# Patient Record
Sex: FEMALE | Race: BLACK OR AFRICAN AMERICAN | URBAN - METROPOLITAN AREA
[De-identification: names, ages, dates, MRNs, and addresses within clinical notes are randomized per-mention and may not be internally consistent; named-entity substitution may affect disease eponyms.]

---

## 2023-06-30 ENCOUNTER — OFFICE VISIT (OUTPATIENT)
Dept: INTERNAL MEDICINE CLINIC | Facility: CLINIC | Age: 73
End: 2023-06-30

## 2023-06-30 VITALS
WEIGHT: 136.6 LBS | HEIGHT: 64 IN | HEART RATE: 62 BPM | DIASTOLIC BLOOD PRESSURE: 83 MMHG | TEMPERATURE: 98 F | OXYGEN SATURATION: 100 % | SYSTOLIC BLOOD PRESSURE: 143 MMHG | BODY MASS INDEX: 23.32 KG/M2

## 2023-06-30 DIAGNOSIS — Z59.9 FINANCIAL DIFFICULTIES: ICD-10-CM

## 2023-06-30 DIAGNOSIS — Z13.1 SCREENING FOR DIABETES MELLITUS: ICD-10-CM

## 2023-06-30 DIAGNOSIS — G47.09 OTHER INSOMNIA: ICD-10-CM

## 2023-06-30 DIAGNOSIS — Z11.59 ENCOUNTER FOR HEPATITIS C SCREENING TEST FOR LOW RISK PATIENT: ICD-10-CM

## 2023-06-30 DIAGNOSIS — Z23 ENCOUNTER FOR IMMUNIZATION: ICD-10-CM

## 2023-06-30 DIAGNOSIS — Z13.220 ENCOUNTER FOR SCREENING FOR LIPID DISORDER: ICD-10-CM

## 2023-06-30 DIAGNOSIS — I10 HYPERTENSION, ESSENTIAL: Primary | ICD-10-CM

## 2023-06-30 DIAGNOSIS — G89.29 CHRONIC BILATERAL LOW BACK PAIN WITHOUT SCIATICA: ICD-10-CM

## 2023-06-30 DIAGNOSIS — M54.50 CHRONIC BILATERAL LOW BACK PAIN WITHOUT SCIATICA: ICD-10-CM

## 2023-06-30 DIAGNOSIS — E78.2 MIXED HYPERLIPIDEMIA: ICD-10-CM

## 2023-06-30 RX ORDER — AMLODIPINE BESYLATE 5 MG/1
5 TABLET ORAL DAILY
COMMUNITY
End: 2023-06-30 | Stop reason: SDUPTHER

## 2023-06-30 RX ORDER — ROSUVASTATIN CALCIUM 5 MG/1
5 TABLET, COATED ORAL DAILY
Qty: 30 TABLET | Refills: 2 | Status: SHIPPED | OUTPATIENT
Start: 2023-06-30 | End: 2024-06-29

## 2023-06-30 RX ORDER — LORAZEPAM 0.5 MG/1
0.5 TABLET ORAL
Qty: 30 TABLET | Refills: 0 | Status: SHIPPED | OUTPATIENT
Start: 2023-06-30

## 2023-06-30 RX ORDER — TIMOLOL MALEATE 5 MG/ML
1 SOLUTION/ DROPS OPHTHALMIC 2 TIMES DAILY
COMMUNITY
Start: 2023-01-26 | End: 2024-01-26

## 2023-06-30 RX ORDER — AMLODIPINE BESYLATE 10 MG/1
1 TABLET ORAL DAILY
COMMUNITY
Start: 2023-04-17 | End: 2023-06-30 | Stop reason: ALTCHOICE

## 2023-06-30 RX ORDER — AMLODIPINE BESYLATE 5 MG/1
5 TABLET ORAL DAILY
Qty: 30 TABLET | Refills: 2 | Status: SHIPPED | OUTPATIENT
Start: 2023-06-30

## 2023-06-30 RX ORDER — LATANOPROST 50 UG/ML
SOLUTION/ DROPS OPHTHALMIC
COMMUNITY
Start: 2023-06-10

## 2023-06-30 RX ORDER — LATANOPROST 50 UG/ML
SOLUTION/ DROPS OPHTHALMIC
COMMUNITY
Start: 2023-03-17 | End: 2023-06-30 | Stop reason: SDUPTHER

## 2023-06-30 RX ORDER — ACETAMINOPHEN 500 MG
500 TABLET ORAL 2 TIMES DAILY
COMMUNITY

## 2023-06-30 RX ORDER — DOXEPIN HYDROCHLORIDE 10 MG/1
10 CAPSULE ORAL
Qty: 30 CAPSULE | Refills: 2 | Status: SHIPPED | OUTPATIENT
Start: 2023-06-30

## 2023-06-30 RX ORDER — HYDROCHLOROTHIAZIDE 25 MG/1
25 TABLET ORAL DAILY
Qty: 30 TABLET | Refills: 2 | Status: SHIPPED | OUTPATIENT
Start: 2023-06-30

## 2023-06-30 RX ORDER — HYDROCHLOROTHIAZIDE 25 MG/1
25 TABLET ORAL DAILY
COMMUNITY
Start: 2023-06-10 | End: 2023-06-30 | Stop reason: SDUPTHER

## 2023-06-30 RX ORDER — DICLOFENAC POTASSIUM 50 MG/1
TABLET, FILM COATED ORAL
COMMUNITY
Start: 2023-06-10

## 2023-06-30 RX ORDER — ROSUVASTATIN CALCIUM 5 MG/1
5 TABLET, COATED ORAL DAILY
COMMUNITY
Start: 2023-01-24 | End: 2023-06-30 | Stop reason: SDUPTHER

## 2023-06-30 RX ORDER — LORAZEPAM 1 MG/1
1 TABLET ORAL EVERY 8 HOURS PRN
COMMUNITY
End: 2023-06-30

## 2023-06-30 RX ORDER — LORAZEPAM 1 MG/1
1 TABLET ORAL
COMMUNITY
Start: 2023-01-24 | End: 2023-06-30

## 2023-06-30 SDOH — ECONOMIC STABILITY - INCOME SECURITY: PROBLEM RELATED TO HOUSING AND ECONOMIC CIRCUMSTANCES, UNSPECIFIED: Z59.9

## 2023-07-01 PROBLEM — E78.2 MIXED HYPERLIPIDEMIA: Status: ACTIVE | Noted: 2023-07-01

## 2023-07-01 PROBLEM — G47.09 OTHER INSOMNIA: Status: ACTIVE | Noted: 2023-07-01

## 2023-07-01 PROBLEM — M54.50 CHRONIC BILATERAL LOW BACK PAIN WITHOUT SCIATICA: Status: ACTIVE | Noted: 2023-07-01

## 2023-07-01 PROBLEM — G89.29 CHRONIC BILATERAL LOW BACK PAIN WITHOUT SCIATICA: Status: ACTIVE | Noted: 2023-07-01

## 2023-07-01 PROBLEM — Z23 ENCOUNTER FOR IMMUNIZATION: Status: ACTIVE | Noted: 2023-07-01

## 2023-07-01 NOTE — ASSESSMENT & PLAN NOTE
BP Readings from Last 3 Encounters:   06/30/23 143/83     She did not take her medications yet this morning  Continue amlodipine 5 mg daily and HCTZ 25 mg daily  Limit sodium and salt intake  Avoid alcohol and caffeine  Recheck in one month, sooner if needed

## 2023-07-01 NOTE — ASSESSMENT & PLAN NOTE
Lumbar spine XR from 1/23/23: 5 projections of the lumbar spine were obtained  There is minimal scoliotic curvature with apex to the right centered at L3  There is minimal interspace narrowing at the right side of the L3/4 interspace and left side of the L2/3 interspace  There is generalized narrowing of the L4/L5 interspace  There is   moderate to severe narrowing at L5/S1  There is degenerative change of the apophyseal joints of L2/L3 and L3/L4 left greater than right  Due to lack of insurance, declines PT at this time  We did discuss pain management as she has had no relief with various medications  She was agreeable  I gave the patient and her daughter the contact information for [facility] financial counselors  The daughter will help her to take care of the above

## 2023-07-01 NOTE — PROGRESS NOTES
Name: Sarah Owens      : 1950      MRN: [id number]  Encounter Provider: Sari Nielson PA-C  Encounter Date: 2023   Encounter department: [facility]     Assessment & Plan     1  Hypertension, essential  Assessment & Plan:  BP Readings from Last 3 Encounters:   23 143/83     She did not take her medications yet this morning  Continue amlodipine 5 mg daily and HCTZ 25 mg daily  Limit sodium and salt intake  Avoid alcohol and caffeine  Recheck in one month, sooner if needed  Orders:  -     hydrochlorothiazide (HYDRODIURIL) 25 mg tablet; Take 1 tablet (25 mg total) by mouth daily  -     amLODIPine (NORVASC) 5 mg tablet; Take 1 tablet (5 mg total) by mouth daily  -     CBC and differential; Future  -     Comprehensive metabolic panel; Future  -     TSH, 3rd generation with Free T4 reflex; Future    2  Mixed hyperlipidemia  Assessment & Plan: Will recheck lipid panel  Continue Crestor 5 mg daily  Low fat diet  Orders:  -     rosuvastatin (CRESTOR) 5 mg tablet; Take 1 tablet (5 mg total) by mouth daily    3  Other insomnia  Assessment & Plan:  She used to be on Ativan 2 mg every night for over 10 years  Last year she did see a PCP in the [facility] who weaned her to 1 mg every night  Her daughter is agreeable to continue to wean her off of this medication  The daughter believes some of her insomnia stems from underlying anxiety and she appears anxious as times  The patient does not have any input on this  We will wean her to 0 5 mg every night for 30 days  Discussed other treatment options  Patient and daughter agreeable to try doxepin  Discussed this may help with anxiety as well  Discussed possible side effects  Will recheck in 1 month  Gave precaution on Ativan withdrawal symptoms  Orders:  -     LORazepam (Ativan) 0 5 mg tablet; Take 1 tablet (0 5 mg total) by mouth daily at bedtime  -     doxepin (SINEquan) 10 mg capsule;  Take 1 capsule (10 mg total) by mouth daily at bedtime    4  Chronic bilateral low back pain without sciatica  Assessment & Plan:  Lumbar spine XR from 1/23/23: 5 projections of the lumbar spine were obtained  There is minimal scoliotic curvature with apex to the right centered at L3  There is minimal interspace narrowing at the right side of the L3/4 interspace and left side of the L2/3 interspace  There is generalized narrowing of the L4/L5 interspace  There is   moderate to severe narrowing at L5/S1  There is degenerative change of the apophyseal joints of L2/L3 and L3/L4 left greater than right  Due to lack of insurance, declines PT at this time  We did discuss pain management as she has had no relief with various medications  She was agreeable  I gave the patient and her daughter the [facility] information for   [facility]  The daughter will help her to take care of the above  Orders:  -     Ambulatory Referral to Pain Management; Future    5  Encounter for immunization  Assessment & Plan:  Received Tdap and Prevnar immunizations  Patient tolerated well  Orders:  -     Pneumococcal Conjugate Vaccine 20-valent (Pcv20)  -     TDAP VACCINE GREATER THAN OR EQUAL TO 6YO IM    6  Encounter for hepatitis C screening test for low risk patient  -     Hepatitis C antibody; Future    7  Screening for diabetes mellitus  -     HEMOGLOBIN A1C W/ EAG ESTIMATION; Future    8  Encounter for screening for lipid disorder  -     Lipid panel; Future    9  Financial difficulties  -     Ambulatory Referral to Social Work Care Management Program; Future         Subjective      Patient is a 67year old female with a PMH of HTN, HLD, and insomnia presenting to [facility] care  She is here with her  and daughter  Her  and her moved here from [facility] last year  They are currently staying with their daughter  She states overall she is feeling well  Denies confusion, dizziness, headaches, and changes in vision   Denies chest pain, palpitations, and SOB  Denies LE edema  She is complaining of chronic back pain  States it has been present for a long time  She has been seen for this before  She was told she has scoliosis  They tried diclofenac and naproxen without relief  She states it is primarily her mid and low back  Denies any injuries or trauma  Denies redness, swelling, or warmth  Denies numbness, tingling, or weakness  The pain does not radiate  She can not describe the pain  Currently 7/10  Patient's daughter interpreted during visit  Declined formal  services  Review of Systems   Constitutional: Negative for appetite change, chills, diaphoresis, fatigue, fever and unexpected weight change  HENT: Negative for congestion, dental problem, hearing loss, sore throat, tinnitus and trouble swallowing  Eyes: Negative for visual disturbance  Respiratory: Negative for cough, chest tightness, shortness of breath and wheezing  Cardiovascular: Negative for chest pain, palpitations and leg swelling  Gastrointestinal: Negative for abdominal pain, blood in stool, constipation, diarrhea, nausea and vomiting  Endocrine: Negative for cold intolerance, heat intolerance, polydipsia, polyphagia and polyuria  Musculoskeletal: Positive for back pain and gait problem (due to pain)  Skin: Negative for rash  Neurological: Negative for dizziness, tremors, weakness, light-headedness, numbness and headaches  Hematological: Negative for adenopathy  Psychiatric/Behavioral: Positive for sleep disturbance  Negative for dysphoric mood, self-injury and suicidal ideas  The patient is nervous/anxious          Current Outpatient Medications on File Prior to Visit   Medication Sig   • acetaminophen (TYLENOL) 500 mg tablet Take 500 mg by mouth 2 (two) times a day   • diclofenac potassium (CATAFLAM) 50 mg tablet take 1 tablet by mouth three times a day if needed for back pain   • latanoprost (XALATAN) 0 005 % ophthalmic solution place 1 "drop into both eyes at bedtime   • timolol (TIMOPTIC) 0 5 % ophthalmic solution Apply 1 drop to eye 2 (two) times a day       Objective     /83 (BP Location: Right arm, Patient Position: Sitting, Cuff Size: Standard)   Pulse 62   Temp 98 °F (36 7 °C) (Temporal)   Ht 5' 4\" (1 626 m)   Wt 62 kg (136 lb 9 6 oz)   SpO2 100%   BMI 23 45 kg/m²     Physical Exam  Vitals and nursing note reviewed  Constitutional:       General: She is awake  She is not in acute distress  Appearance: Normal appearance  She is well-developed, well-groomed and normal weight  She is not ill-appearing  HENT:      Head: Normocephalic and atraumatic  Eyes:      General: No scleral icterus  Conjunctiva/sclera: Conjunctivae normal    Cardiovascular:      Rate and Rhythm: Normal rate and regular rhythm  Pulses: Normal pulses  Radial pulses are 2+ on the right side and 2+ on the left side  Heart sounds: Normal heart sounds  No murmur heard  Pulmonary:      Effort: Pulmonary effort is normal  No respiratory distress  Breath sounds: Normal breath sounds and air entry  No decreased air movement  No decreased breath sounds, wheezing, rhonchi or rales  Abdominal:      General: Abdomen is flat  Bowel sounds are normal  There is no distension  Palpations: Abdomen is soft  There is no mass  Tenderness: There is no abdominal tenderness  There is no right CVA tenderness, left CVA tenderness, guarding or rebound  Hernia: No hernia is present  Musculoskeletal:         General: Normal range of motion  Cervical back: Normal and neck supple  Thoracic back: Deformity present  No swelling, edema, signs of trauma, lacerations, spasms, tenderness or bony tenderness  Normal range of motion  Scoliosis present  Lumbar back: Deformity present  No swelling, edema, signs of trauma, lacerations, spasms, tenderness or bony tenderness  Normal range of motion   Negative right straight leg raise " test and negative left straight leg raise test  Scoliosis present  Right lower leg: No edema  Left lower leg: No edema  Lymphadenopathy:      Cervical: No cervical adenopathy  Skin:     General: Skin is warm  Coloration: Skin is not jaundiced  Findings: No rash  Neurological:      General: No focal deficit present  Mental Status: She is alert and oriented to person, place, and time  Mental status is at baseline  Gait: Gait abnormal (using cane)  Psychiatric:         Attention and Perception: Attention normal          Mood and Affect: Mood and affect normal          Speech: Speech normal          Behavior: Behavior normal  Behavior is cooperative           Cognition and Memory: Cognition normal        Joya Bowman PA-C

## 2023-07-01 NOTE — ASSESSMENT & PLAN NOTE
She used to be on Ativan 2 mg every night for over 10 years  [place] 1 mg every night  Her daughter is agreeable to continue to wean her off of this medication  The daughter believes some of her insomnia stems from underlying anxiety and she appears anxious as times  The patient does not have any input on this  We will wean her to 0 5 mg every night for 30 days  Discussed other treatment options  Patient and daughter agreeable to try doxepin  Discussed this may help with anxiety as well  Discussed possible side effects  Will recheck in 1 month  Gave precaution on Ativan withdrawal symptoms

## 2023-07-21 ENCOUNTER — APPOINTMENT (OUTPATIENT)
Dept: LAB | Facility: CLINIC | Age: 73
End: 2023-07-21

## 2023-07-21 DIAGNOSIS — Z13.220 ENCOUNTER FOR SCREENING FOR LIPID DISORDER: ICD-10-CM

## 2023-07-21 DIAGNOSIS — Z11.59 ENCOUNTER FOR HEPATITIS C SCREENING TEST FOR LOW RISK PATIENT: ICD-10-CM

## 2023-07-21 DIAGNOSIS — Z13.1 SCREENING FOR DIABETES MELLITUS: ICD-10-CM

## 2023-07-21 DIAGNOSIS — I10 HYPERTENSION, ESSENTIAL: ICD-10-CM

## 2023-07-21 LAB
ALBUMIN SERPL BCP-MCNC: 3.9 G/DL (ref 3.5–5)
ALP SERPL-CCNC: 80 U/L (ref 46–116)
ALT SERPL W P-5'-P-CCNC: 51 U/L (ref 12–78)
ANION GAP SERPL CALCULATED.3IONS-SCNC: 2 MMOL/L
AST SERPL W P-5'-P-CCNC: 34 U/L (ref 5–45)
BASOPHILS # BLD AUTO: 0.05 THOUSANDS/ÂΜL (ref 0–0.1)
BASOPHILS NFR BLD AUTO: 1 % (ref 0–1)
BILIRUB SERPL-MCNC: 0.48 MG/DL (ref 0.2–1)
BUN SERPL-MCNC: 18 MG/DL (ref 5–25)
CALCIUM SERPL-MCNC: 10 MG/DL (ref 8.3–10.1)
CHLORIDE SERPL-SCNC: 105 MMOL/L (ref 96–108)
CHOLEST SERPL-MCNC: 175 MG/DL
CO2 SERPL-SCNC: 31 MMOL/L (ref 21–32)
CREAT SERPL-MCNC: 1.01 MG/DL (ref 0.6–1.3)
EOSINOPHIL # BLD AUTO: 0.14 THOUSAND/ÂΜL (ref 0–0.61)
EOSINOPHIL NFR BLD AUTO: 2 % (ref 0–6)
ERYTHROCYTE [DISTWIDTH] IN BLOOD BY AUTOMATED COUNT: 12.1 % (ref 11.6–15.1)
GFR SERPL CREATININE-BSD FRML MDRD: 55 ML/MIN/1.73SQ M
GLUCOSE P FAST SERPL-MCNC: 98 MG/DL (ref 65–99)
HCT VFR BLD AUTO: 38.4 % (ref 34.8–46.1)
HDLC SERPL-MCNC: 79 MG/DL
HGB BLD-MCNC: 13.1 G/DL (ref 11.5–15.4)
IMM GRANULOCYTES # BLD AUTO: 0.02 THOUSAND/UL (ref 0–0.2)
IMM GRANULOCYTES NFR BLD AUTO: 0 % (ref 0–2)
LDLC SERPL CALC-MCNC: 80 MG/DL (ref 0–100)
LYMPHOCYTES # BLD AUTO: 1.3 THOUSANDS/ÂΜL (ref 0.6–4.47)
LYMPHOCYTES NFR BLD AUTO: 20 % (ref 14–44)
MCH RBC QN AUTO: 31.8 PG (ref 26.8–34.3)
MCHC RBC AUTO-ENTMCNC: 34.1 G/DL (ref 31.4–37.4)
MCV RBC AUTO: 93 FL (ref 82–98)
MONOCYTES # BLD AUTO: 0.43 THOUSAND/ÂΜL (ref 0.17–1.22)
MONOCYTES NFR BLD AUTO: 7 % (ref 4–12)
NEUTROPHILS # BLD AUTO: 4.45 THOUSANDS/ÂΜL (ref 1.85–7.62)
NEUTS SEG NFR BLD AUTO: 70 % (ref 43–75)
NONHDLC SERPL-MCNC: 96 MG/DL
NRBC BLD AUTO-RTO: 0 /100 WBCS
PLATELET # BLD AUTO: 285 THOUSANDS/UL (ref 149–390)
PMV BLD AUTO: 10.1 FL (ref 8.9–12.7)
POTASSIUM SERPL-SCNC: 3.3 MMOL/L (ref 3.5–5.3)
PROT SERPL-MCNC: 8.2 G/DL (ref 6.4–8.4)
RBC # BLD AUTO: 4.12 MILLION/UL (ref 3.81–5.12)
SODIUM SERPL-SCNC: 138 MMOL/L (ref 135–147)
TRIGL SERPL-MCNC: 79 MG/DL
TSH SERPL DL<=0.05 MIU/L-ACNC: 1.37 UIU/ML (ref 0.45–4.5)
WBC # BLD AUTO: 6.39 THOUSAND/UL (ref 4.31–10.16)

## 2023-07-21 PROCEDURE — 85025 COMPLETE CBC W/AUTO DIFF WBC: CPT

## 2023-07-21 PROCEDURE — 80061 LIPID PANEL: CPT

## 2023-07-21 PROCEDURE — 84443 ASSAY THYROID STIM HORMONE: CPT

## 2023-07-21 PROCEDURE — 36415 COLL VENOUS BLD VENIPUNCTURE: CPT

## 2023-07-21 PROCEDURE — 80053 COMPREHEN METABOLIC PANEL: CPT

## 2023-07-28 ENCOUNTER — OFFICE VISIT (OUTPATIENT)
Dept: INTERNAL MEDICINE CLINIC | Facility: CLINIC | Age: 73
End: 2023-07-28

## 2023-07-28 VITALS
WEIGHT: 137 LBS | SYSTOLIC BLOOD PRESSURE: 138 MMHG | BODY MASS INDEX: 23.52 KG/M2 | DIASTOLIC BLOOD PRESSURE: 82 MMHG | HEART RATE: 73 BPM | TEMPERATURE: 97.8 F

## 2023-07-28 DIAGNOSIS — E78.2 MIXED HYPERLIPIDEMIA: ICD-10-CM

## 2023-07-28 DIAGNOSIS — I10 HYPERTENSION, ESSENTIAL: Primary | ICD-10-CM

## 2023-07-28 DIAGNOSIS — G47.09 OTHER INSOMNIA: ICD-10-CM

## 2023-07-28 PROCEDURE — 99214 OFFICE O/P EST MOD 30 MIN: CPT | Performed by: PHYSICIAN ASSISTANT

## 2023-07-31 PROBLEM — Z23 ENCOUNTER FOR IMMUNIZATION: Status: RESOLVED | Noted: 2023-07-01 | Resolved: 2023-07-31

## 2023-07-31 RX ORDER — LORAZEPAM 1 MG/1
1 TABLET ORAL
Qty: 30 TABLET | Refills: 0 | Status: SHIPPED | OUTPATIENT
Start: 2023-07-31

## 2023-07-31 NOTE — PROGRESS NOTES
Name: Ho Garcia      : 1950      MRN: [id number]  Encounter Provider: Narda Murrell PA-C  Encounter Date: 2023   Encounter department: [address] [place] [address]    Assessment & Plan     1. Hypertension, essential  Assessment & Plan:  BP Readings from Last 3 Encounters:   23 138/82   23 143/83     Improved. Continue amlodipine 5 mg daily and HCTZ 25 mg daily. Reviewed that her potassium was slightly low. Likely secondary to thiazide. Recommend increasing potassium intake. Will recheck BMP at next visit. Limit sodium an salt intake. Avoid alcohol and caffeine. 2. Mixed hyperlipidemia  Assessment & Plan:  Lab Results   Component Value Date    CHOLESTEROL 175 2023     Lab Results   Component Value Date    HDL 79 2023     Lab Results   Component Value Date    TRIG 79 2023     Lab Results   Component Value Date    [address] 96 2023     Lab Results   Component Value Date    LDLCALC 80 2023     ASCVD risk 16.7%. Continue rosuvastatin 5 mg daily. Low fat diet. 3. Other insomnia  Assessment & Plan:  Attempted to wean patient off of 1 mg daily. We started her on 0.5 mg daily ay previous visit and started her on doxepin. States the doxepin made her dizzy and she was not sleeping. She went back up to 1 mg daily at bedtime of Ativan. ADR to doxepin vs withdrawal from benzodiazepine. Patient is nervous for taper and trying new medications. Will continue Ativan 1 mg daily at bedtime for now. Reviewed sleep hygiene. Orders:  -     LORazepam (ATIVAN) 1 mg tablet; Take 1 tablet (1 mg total) by mouth daily at bedtime         Subjective      Patient is a 67year old female with a PMH of HTN, HLD, and insomnia presenting to for follow up on lab work. She is here with her  and daughter. Her  and her moved here from [facility] last year. They are currently staying with their daughter. She states overall she is feeling well.  She tried to wean off of Ativan at her previous visit and start doxepin instead. States it made her dizzy and she could not sleep. She went back to 1 mg of ativan daily at bedtime. Review of Systems   Constitutional: Negative for appetite change, chills, diaphoresis, fatigue, fever and unexpected weight change. HENT: Negative for congestion, dental problem, hearing loss, sore throat, tinnitus and trouble swallowing. Eyes: Negative for visual disturbance. Respiratory: Negative for cough, chest tightness, shortness of breath and wheezing. Cardiovascular: Negative for chest pain, palpitations and leg swelling. Gastrointestinal: Negative for abdominal pain, blood in stool, constipation, diarrhea, nausea and vomiting. Endocrine: Negative for cold intolerance, heat intolerance, polydipsia, polyphagia and polyuria. Musculoskeletal: Positive for back pain and gait problem (due to pain). Skin: Negative for rash. Neurological: Negative for dizziness, tremors, weakness, light-headedness, numbness and headaches. Hematological: Negative for adenopathy. Psychiatric/Behavioral: Positive for sleep disturbance. Negative for dysphoric mood, self-injury and suicidal ideas. The patient is nervous/anxious.         Current Outpatient Medications on File Prior to Visit   Medication Sig   • acetaminophen (TYLENOL) 500 mg tablet Take 500 mg by mouth 2 (two) times a day   • amLODIPine (NORVASC) 5 mg tablet Take 1 tablet (5 mg total) by mouth daily   • diclofenac potassium (CATAFLAM) 50 mg tablet take 1 tablet by mouth three times a day if needed for back pain   • hydrochlorothiazide (HYDRODIURIL) 25 mg tablet Take 1 tablet (25 mg total) by mouth daily   • latanoprost (XALATAN) 0.005 % ophthalmic solution place 1 drop into both eyes at bedtime   • rosuvastatin (CRESTOR) 5 mg tablet Take 1 tablet (5 mg total) by mouth daily   • timolol (TIMOPTIC) 0.5 % ophthalmic solution Apply 1 drop to eye 2 (two) times a day   • [DISCONTINUED] doxepin (SINEquan) 10 mg capsule Take 1 capsule (10 mg total) by mouth daily at bedtime       Objective     /82 (BP Location: Left arm, Patient Position: Sitting, Cuff Size: Standard)   Pulse 73   Temp 97.8 °F (36.6 °C) (Temporal)   Wt 62.1 kg (137 lb)   BMI 23.52 kg/m²     Physical Exam  Vitals and nursing note reviewed. Constitutional:       General: She is awake. She is not in acute distress. Appearance: Normal appearance. She is well-developed, well-groomed and normal weight. She is not ill-appearing. HENT:      Head: Normocephalic and atraumatic. Eyes:      General: No scleral icterus. Conjunctiva/sclera: Conjunctivae normal.   Cardiovascular:      Rate and Rhythm: Normal rate and regular rhythm. Pulses: Normal pulses. Radial pulses are 2+ on the right side and 2+ on the left side. Heart sounds: Normal heart sounds. No murmur heard. Pulmonary:      Effort: Pulmonary effort is normal. No respiratory distress. Breath sounds: Normal breath sounds and air entry. No decreased air movement. No decreased breath sounds, wheezing, rhonchi or rales. Abdominal:      General: Abdomen is flat. Bowel sounds are normal. There is no distension. Palpations: Abdomen is soft. There is no mass. Tenderness: There is no abdominal tenderness. There is no right CVA tenderness, left CVA tenderness, guarding or rebound. Hernia: No hernia is present. Musculoskeletal:         General: Normal range of motion. Cervical back: Normal and neck supple. Thoracic back: Deformity present. No swelling, edema, signs of trauma, lacerations, spasms, tenderness or bony tenderness. Normal range of motion. Scoliosis present. Lumbar back: Deformity present. No swelling, edema, signs of trauma, lacerations, spasms, tenderness or bony tenderness. Normal range of motion.  Negative right straight leg raise test and negative left straight leg raise test. Scoliosis present. Right lower leg: No edema. Left lower leg: No edema. Lymphadenopathy:      Cervical: No cervical adenopathy. Skin:     General: Skin is warm. Coloration: Skin is not jaundiced. Findings: No rash. Neurological:      General: No focal deficit present. Mental Status: She is alert and oriented to person, place, and time. Mental status is at baseline. Gait: Gait abnormal (using cane). Psychiatric:         Attention and Perception: Attention normal.         Mood and Affect: Mood and affect normal.         Speech: Speech normal.         Behavior: Behavior normal. Behavior is cooperative.          Cognition and Memory: Cognition normal.       Bekah Watkins PA-C

## 2023-07-31 NOTE — ASSESSMENT & PLAN NOTE
Lab Results   Component Value Date    CHOLESTEROL 175 07/21/2023     Lab Results   Component Value Date    HDL 79 07/21/2023     Lab Results   Component Value Date    TRIG 79 07/21/2023     Lab Results   Component Value Date    [address] [facility] 96 07/21/2023     Lab Results   Component Value Date    LDLCALC 80 07/21/2023     ASCVD risk 16.7%. Continue rosuvastatin 5 mg daily. Low fat diet.

## 2023-07-31 NOTE — ASSESSMENT & PLAN NOTE
BP Readings from Last 3 Encounters:   07/28/23 138/82   06/30/23 143/83     Improved. Continue amlodipine 5 mg daily and HCTZ 25 mg daily. Reviewed that her potassium was slightly low. Likely secondary to thiazide. Recommend increasing potassium intake. Will recheck BMP at next visit. Limit sodium an salt intake. Avoid alcohol and caffeine.

## 2023-07-31 NOTE — ASSESSMENT & PLAN NOTE
Attempted to wean patient off of 1 mg daily. We started her on 0.5 mg daily ay previous visit and started her on doxepin. States the doxepin made her dizzy and she was not sleeping. She went back up to 1 mg daily at bedtime of Ativan. ADR to doxepin vs withdrawal from benzodiazepine. Patient is nervous for taper and trying new medications. Will continue Ativan 1 mg daily at bedtime for now. Reviewed sleep hygiene.

## 2023-08-01 ENCOUNTER — TELEPHONE (OUTPATIENT)
Dept: INTERNAL MEDICINE CLINIC | Facility: CLINIC | Age: 73
End: 2023-08-01

## 2023-08-01 NOTE — TELEPHONE ENCOUNTER
Patient's daughter Sylvia Vick left a message with questions about the Doxepin. I called and left a voicemail for Emerald to call back.

## 2023-08-03 NOTE — TELEPHONE ENCOUNTER
I can send her a refill. At our visit, I was under the impression she wanted to only take Ativan 1 mg for sleep as the doxepin was not wokring and it caused dizziness. Please let me know.  Thank you

## 2023-08-04 DIAGNOSIS — G47.09 OTHER INSOMNIA: Primary | ICD-10-CM

## 2023-08-04 RX ORDER — DOXEPIN HYDROCHLORIDE 10 MG/1
10 CAPSULE ORAL
Qty: 30 CAPSULE | Refills: 5 | Status: SHIPPED | OUTPATIENT
Start: 2023-08-04

## 2023-08-04 NOTE — TELEPHONE ENCOUNTER
Spoke with Himanshu and she stated that when patient first started taking the Doxepin it caused dizziness, but she adjusted to it. She would like her to still take the Doxepin b/c the Ativan alone does not help patient sleep.

## 2023-08-14 NOTE — TELEPHONE ENCOUNTER
----- Message from Shania Mahmood sent at 8/14/2023 11:16 AM CDT -----  Contact: Deja  Patient is calling to speak with the department to request a daily prescription for patient Contacts. Explains will be going out of town  and will like a new prescription die to allergy seasons and been going through patient contacts.Please give a call back at .[phone] as requested.  Thanks  LR      Please review. Should patient still be taking Doxepin?

## 2023-09-15 DIAGNOSIS — G47.09 OTHER INSOMNIA: ICD-10-CM

## 2023-09-15 RX ORDER — LORAZEPAM 1 MG/1
1 TABLET ORAL
Qty: 30 TABLET | Refills: 0 | Status: SHIPPED | OUTPATIENT
Start: 2023-09-15

## 2023-09-27 ENCOUNTER — PATIENT OUTREACH (OUTPATIENT)
Dept: INTERNAL MEDICINE CLINIC | Facility: CLINIC | Age: 73
End: 2023-09-27

## 2023-09-27 NOTE — PROGRESS NOTES
ERICA spoke to pt's Navin Gonzalez [phone] this date to assist with community resources for pt and her  who is also a pt here at our Office. Pt's has dgt on his communication consent. Pt and her  have relocated here from [facility] and are now living with the dgt. in her home which she rents. She shares pt is does need some assistance with her in his ADLs. ie she need supervision with bathing so she does not fall. Pt uses a cane. Dgt and  help with meals. Dgt assist them with transporation to appointments around her part time work schedule. She shares  they are here on a "Temporary Protected Status". She is trying to get them medical coverage. She has applied for Star Assistance with our [employer]. She is interested in possibly applying for RADHA and SW shares our Financial Counselors can assist with same if they are eligible for same. Erica has also reviewed the [facility] also has Financial Assistance,  Sw provide the # for [facility]  [phone] as well as the [facility] # [phone]. Pt has unfortunately needed hospitalization last year as has a large bill with [address] has suggested talking with their Financial Counselor re a possible MA application of  their assisatance program.  Unfortunately she was NOT eligible for MA at that time. Dgt would like to see if she can get RADHA or other coverage for her parents. .  SW to reach out to [employer] as well to see if pt can get [place] at this time. In addition, ERICA has provided dgt contact info for the [employer] which provided resources for Migrants, Immigrants ,Refugees and Asylee Families with resources and  referral if needed. Dusty Cornejo [phone] . ERICA also provided contact for [facility] [employer] [facility] who assist all @ [phone].     ERICA has reviewed there are other resources available on FIND HELP which is on the  [facility] CHART Application which she  is familiar with. SW offered to send Food Bank info to her but she shares she can look it up on the application and is already familiar with local Food [employer] and churches who assist in the area. SW has also reviewed with dgt that if pt is MA approved and she feels pt needs additional hands on help in the Home we could explore the [place] Waiver Program .   ERICA briefly reviewed same and encouraged her to f/u with SW if MA approved and pt in need of same. Patient / family do not have any further questions, concerns, or other needs at this time. Patient / family have my contact # and PCP office # if needed. Social Work to remain available to assist as indicated. Please re-consult Social Work if needed.

## 2023-10-27 ENCOUNTER — OFFICE VISIT (OUTPATIENT)
Dept: INTERNAL MEDICINE CLINIC | Facility: CLINIC | Age: 73
End: 2023-10-27

## 2023-10-27 VITALS
BODY MASS INDEX: 24.13 KG/M2 | SYSTOLIC BLOOD PRESSURE: 151 MMHG | WEIGHT: 140.6 LBS | OXYGEN SATURATION: 99 % | HEART RATE: 65 BPM | DIASTOLIC BLOOD PRESSURE: 85 MMHG | TEMPERATURE: 98.8 F

## 2023-10-27 DIAGNOSIS — G89.29 CHRONIC BILATERAL LOW BACK PAIN WITHOUT SCIATICA: ICD-10-CM

## 2023-10-27 DIAGNOSIS — G47.09 OTHER INSOMNIA: ICD-10-CM

## 2023-10-27 DIAGNOSIS — I10 HYPERTENSION, ESSENTIAL: Primary | ICD-10-CM

## 2023-10-27 DIAGNOSIS — E78.2 MIXED HYPERLIPIDEMIA: ICD-10-CM

## 2023-10-27 DIAGNOSIS — M54.50 CHRONIC BILATERAL LOW BACK PAIN WITHOUT SCIATICA: ICD-10-CM

## 2023-10-27 DIAGNOSIS — Z23 ENCOUNTER FOR IMMUNIZATION: ICD-10-CM

## 2023-10-27 PROCEDURE — 90662 IIV NO PRSV INCREASED AG IM: CPT | Performed by: PHYSICIAN ASSISTANT

## 2023-10-27 PROCEDURE — 99214 OFFICE O/P EST MOD 30 MIN: CPT | Performed by: PHYSICIAN ASSISTANT

## 2023-10-27 PROCEDURE — 90471 IMMUNIZATION ADMIN: CPT | Performed by: PHYSICIAN ASSISTANT

## 2023-10-27 RX ORDER — AMLODIPINE BESYLATE 5 MG/1
5 TABLET ORAL DAILY
Qty: 30 TABLET | Refills: 2 | Status: SHIPPED | OUTPATIENT
Start: 2023-10-27

## 2023-10-27 RX ORDER — LORAZEPAM 2 MG/1
2 TABLET ORAL
Qty: 30 TABLET | Refills: 0 | Status: SHIPPED | OUTPATIENT
Start: 2023-10-27

## 2023-10-27 RX ORDER — ROSUVASTATIN CALCIUM 5 MG/1
5 TABLET, COATED ORAL DAILY
Qty: 30 TABLET | Refills: 2 | Status: SHIPPED | OUTPATIENT
Start: 2023-10-27 | End: 2024-10-26

## 2023-10-27 RX ORDER — DICLOFENAC POTASSIUM 50 MG/1
50 TABLET, FILM COATED ORAL 2 TIMES DAILY
Qty: 60 TABLET | Refills: 2 | Status: SHIPPED | OUTPATIENT
Start: 2023-10-27

## 2023-10-27 RX ORDER — HYDROCHLOROTHIAZIDE 25 MG/1
25 TABLET ORAL DAILY
Qty: 30 TABLET | Refills: 2 | Status: SHIPPED | OUTPATIENT
Start: 2023-10-27

## 2023-10-30 NOTE — ASSESSMENT & PLAN NOTE
BP Readings from Last 3 Encounters:   10/27/23 151/85   07/28/23 138/82   06/30/23 143/83      Uncontrolled. She has been out of medications for 2 weeks. Restart amlodipine 5 mg daily and HCTZ 25 mg daily. Reviewed that her potassium was slightly low previously. Likely secondary to thiazide. Will recheck BMP today. Limit sodium an salt intake. Avoid alcohol and caffeine.

## 2023-10-30 NOTE — PROGRESS NOTES
Name: Shanell Blanchard      : 1950      MRN: [id number]  Encounter Provider: Dallas Ferreira PA-C  Encounter Date: 10/27/2023   Encounter department: [address] [place] [address]    Assessment & Plan     1. Hypertension, essential  Assessment & Plan:  BP Readings from Last 3 Encounters:   10/27/23 151/85   23 138/82   23 143/83      Uncontrolled. She has been out of medications for 2 weeks. Restart amlodipine 5 mg daily and HCTZ 25 mg daily. Reviewed that her potassium was slightly low previously. Likely secondary to thiazide. Will recheck BMP today. Limit sodium an salt intake. Avoid alcohol and caffeine. Orders:  -     amLODIPine (NORVASC) 5 mg tablet; Take 1 tablet (5 mg total) by mouth daily  -     hydrochlorothiazide (HYDRODIURIL) 25 mg tablet; Take 1 tablet (25 mg total) by mouth daily  -     Basic metabolic panel; Future    2. Mixed hyperlipidemia  Assessment & Plan:  Lab Results   Component Value Date    CHOLESTEROL 175 2023     Lab Results   Component Value Date    HDL 79 2023     Lab Results   Component Value Date    TRIG 79 2023     Lab Results   Component Value Date    [address] 96 2023      Lab Results   Component Value Date    [address] [facility] 80 2023      ASCVD risk 19.7%. Continue rosuvastatin 5 mg daily. Low fat diet. Will recheck next visit. Orders:  -     rosuvastatin (CRESTOR) 5 mg tablet; Take 1 tablet (5 mg total) by mouth daily    3. Other insomnia  Assessment & Plan:  Initially patient presented on Ativan 2 mg every night. Decreased to 1 mg every night and then 0.5 mg every night. Tried doxepin 10 mg every night while trying to taper. Patient reported that doxepin did not help and made her dizzy. She could not sleep and went back up to 2 mg every night. She had a prescription left over from [facility]. It will be difficult to wean patient off of Ativan, especially without medication coverage.  Patient and daughter worried about ineffectiveness of medications, side effects, and cost without insurance. Declines sleep medicine referral due to cost. Will temporarily continue 2 mg Ativan every night. Patient and daughter understand risk. Will revisit next visit. Patient's daughter will try to obtain medical coverage. Orders:  -     LORazepam (ATIVAN) 2 mg tablet; Take 1 tablet (2 mg total) by mouth daily at bedtime    4. Chronic bilateral low back pain without sciatica  Assessment & Plan:  Lumbar spine XR from 1/23/23: 5 projections of the lumbar spine were obtained. There is minimal scoliotic curvature with apex to the right centered at L3. There is minimal interspace narrowing at the right side of the L3/4 interspace and left side of the L2/3 interspace. There is generalized narrowing of the L4/L5 interspace. There is   moderate to severe narrowing at L5/S1. There is degenerative change of the apophyseal joints of L2/L3 and L3/L4 left greater than right. Due to lack of insurance, declines PT at this time. Declines [employer] program as well. She has been taking diclofenac 50 mg 1-2 times daily which she states help. Previously also tried other medications such as naproxen which did not help. She does not want any referrals at this time. Her daughter is working on getting them insurance. Orders:  -     diclofenac potassium (CATAFLAM) 50 mg tablet; Take 1 tablet (50 mg total) by mouth 2 (two) times a day    5. Encounter for immunization  Assessment & Plan:  Influenza immunization given today. Patient tolerated well. Orders:  -     influenza vaccine, high-dose, PF 0.7 mL (FLUZONE HIGH-DOSE)           Subjective      Patient is a 67year old female with a PMH of HTN, HLD, and insomnia presenting for follow up . She is here with her  and daughter. Her  and her moved here from [facility] last year. They are currently staying with their daughter. She states overall she is feeling well.  She tried to wean off of Ativan at her previous visit and start doxepin instead. States it made her dizzy and she could not sleep. She went back to 2 mg of ativan daily at bedtime. She has been out of all of her other medications for 2 weeks. Patient's daughter interpreted during visit. Declined formal  services. Review of Systems   Constitutional:  Negative for appetite change, chills, diaphoresis, fatigue, fever and unexpected weight change. HENT:  Negative for congestion, dental problem, hearing loss, sore throat, tinnitus and trouble swallowing. Eyes:  Negative for visual disturbance. Respiratory:  Negative for cough, chest tightness, shortness of breath and wheezing. Cardiovascular:  Negative for chest pain, palpitations and leg swelling. Gastrointestinal:  Negative for abdominal pain, blood in stool, constipation, diarrhea, nausea and vomiting. Endocrine: Negative for cold intolerance, heat intolerance, polydipsia, polyphagia and polyuria. Musculoskeletal:  Positive for back pain (chronic) and gait problem (due to pain). Skin:  Negative for rash. Neurological:  Negative for dizziness, tremors, weakness, light-headedness, numbness and headaches. Hematological:  Negative for adenopathy. Psychiatric/Behavioral:  Positive for sleep disturbance. Negative for dysphoric mood, self-injury and suicidal ideas. The patient is nervous/anxious.         Current Outpatient Medications on File Prior to Visit   Medication Sig    acetaminophen (TYLENOL) 500 mg tablet Take 500 mg by mouth 2 (two) times a day    latanoprost (XALATAN) 0.005 % ophthalmic solution place 1 drop into both eyes at bedtime    timolol (TIMOPTIC) 0.5 % ophthalmic solution Apply 1 drop to eye 2 (two) times a day       Objective     /85 (BP Location: Left arm, Patient Position: Sitting, Cuff Size: Standard)   Pulse 65   Temp 98.8 °F (37.1 °C) (Temporal)   Wt 63.8 kg (140 lb 9.6 oz)   SpO2 99%   BMI 24.13 kg/m²     Physical Exam  Vitals and nursing note reviewed. Constitutional:       General: She is awake. She is not in acute distress. Appearance: Normal appearance. She is well-developed, well-groomed and normal weight. She is not ill-appearing. HENT:      Head: Normocephalic and atraumatic. Eyes:      General: No scleral icterus. Conjunctiva/sclera: Conjunctivae normal.   Cardiovascular:      Rate and Rhythm: Normal rate and regular rhythm. Pulses: Normal pulses. Radial pulses are 2+ on the right side and 2+ on the left side. Heart sounds: Normal heart sounds. No murmur heard. Pulmonary:      Effort: Pulmonary effort is normal. No respiratory distress. Breath sounds: Normal breath sounds and air entry. No decreased air movement. No decreased breath sounds, wheezing, rhonchi or rales. Abdominal:      General: Abdomen is flat. Bowel sounds are normal. There is no distension. Palpations: Abdomen is soft. There is no mass. Tenderness: There is no abdominal tenderness. There is no right CVA tenderness, left CVA tenderness, guarding or rebound. Hernia: No hernia is present. Musculoskeletal:         General: Normal range of motion. Cervical back: Normal and neck supple. Thoracic back: Deformity present. No swelling, edema, signs of trauma, lacerations, spasms, tenderness or bony tenderness. Normal range of motion. Scoliosis present. Lumbar back: Deformity present. No swelling, edema, signs of trauma, lacerations, spasms, tenderness or bony tenderness. Normal range of motion. Negative right straight leg raise test and negative left straight leg raise test. Scoliosis present. Right lower leg: No edema. Left lower leg: No edema. Lymphadenopathy:      Cervical: No cervical adenopathy. Skin:     General: Skin is warm. Coloration: Skin is not jaundiced. Findings: No rash. Neurological:      General: No focal deficit present.       Mental Status: She is alert and oriented to person, place, and time. Mental status is at baseline. Gait: Gait abnormal (using cane). Psychiatric:         Attention and Perception: Attention normal.         Mood and Affect: Mood and affect normal.         Speech: Speech normal.         Behavior: Behavior normal. Behavior is cooperative.          Cognition and Memory: Cognition normal.       Isabela Salazar PA-C

## 2023-10-30 NOTE — ASSESSMENT & PLAN NOTE
Lumbar spine XR from 1/23/23: 5 projections of the lumbar spine were obtained. There is minimal scoliotic curvature with apex to the right centered at L3. There is minimal interspace narrowing at the right side of the L3/4 interspace and left side of the L2/3 interspace. There is generalized narrowing of the L4/L5 interspace. There is   moderate to severe narrowing at L5/S1. There is degenerative change of the apophyseal joints of L2/L3 and L3/L4 left greater than right. Due to lack of insurance, declines PT at this time. Declines [employer] program as well. She has been taking diclofenac 50 mg 1-2 times daily which she states help. Previously also tried other medications such as naproxen which did not help. She does not want any referrals at this time. Her daughter is working on getting them insurance.

## 2023-10-30 NOTE — ASSESSMENT & PLAN NOTE
Lab Results   Component Value Date    CHOLESTEROL 175 07/21/2023     Lab Results   Component Value Date    HDL 79 07/21/2023     Lab Results   Component Value Date    TRIG 79 07/21/2023     Lab Results   Component Value Date    [address] [facility] [address] 96 07/21/2023      Lab Results   Component Value Date    LDLCALC 80 07/21/2023      ASCVD risk 19.7%. Continue rosuvastatin 5 mg daily. Low fat diet. Will recheck next visit.

## 2023-10-30 NOTE — ASSESSMENT & PLAN NOTE
Initially patient presented on Ativan 2 mg every night. Decreased to 1 mg every night and then 0.5 mg every night. Tried doxepin 10 mg every night while trying to taper. Patient reported that doxepin did not help and made her dizzy. She could not sleep and went back up to 2 mg every night. She had a prescription left over from [facility]. It will be difficult to wean patient off of Ativan, especially without medication coverage. Patient and daughter worried about ineffectiveness of medications, side effects, and cost without insurance. Declines sleep medicine referral due to cost. Will temporarily continue 2 mg Ativan every night. Patient and daughter understand risk. Will revisit next visit. Patient's daughter will try to obtain medical coverage.

## 2023-11-24 DIAGNOSIS — G47.09 OTHER INSOMNIA: ICD-10-CM

## 2023-11-24 DIAGNOSIS — H40.10X4 OPEN-ANGLE GLAUCOMA OF BOTH EYES, INDETERMINATE STAGE, UNSPECIFIED OPEN-ANGLE GLAUCOMA TYPE: Primary | ICD-10-CM

## 2023-11-28 RX ORDER — TIMOLOL MALEATE 5 MG/ML
1 SOLUTION/ DROPS OPHTHALMIC 2 TIMES DAILY
Qty: 5 ML | Refills: 2 | Status: SHIPPED | OUTPATIENT
Start: 2023-11-28 | End: 2024-11-27

## 2023-11-28 RX ORDER — LORAZEPAM 2 MG/1
2 TABLET ORAL
Qty: 30 TABLET | Refills: 0 | Status: SHIPPED | OUTPATIENT
Start: 2023-11-28

## 2023-11-28 RX ORDER — LATANOPROST 50 UG/ML
1 SOLUTION/ DROPS OPHTHALMIC DAILY
Qty: 2.5 ML | Refills: 2 | Status: SHIPPED | OUTPATIENT
Start: 2023-11-28

## 2023-12-22 DIAGNOSIS — G47.09 OTHER INSOMNIA: ICD-10-CM

## 2023-12-22 DIAGNOSIS — E78.2 MIXED HYPERLIPIDEMIA: ICD-10-CM

## 2023-12-22 DIAGNOSIS — I10 HYPERTENSION, ESSENTIAL: ICD-10-CM

## 2023-12-22 DIAGNOSIS — G89.29 CHRONIC BILATERAL LOW BACK PAIN WITHOUT SCIATICA: ICD-10-CM

## 2023-12-22 DIAGNOSIS — H40.10X4 OPEN-ANGLE GLAUCOMA OF BOTH EYES, INDETERMINATE STAGE, UNSPECIFIED OPEN-ANGLE GLAUCOMA TYPE: ICD-10-CM

## 2023-12-22 DIAGNOSIS — M54.50 CHRONIC BILATERAL LOW BACK PAIN WITHOUT SCIATICA: ICD-10-CM

## 2023-12-22 RX ORDER — DICLOFENAC POTASSIUM 50 MG/1
50 TABLET, FILM COATED ORAL 2 TIMES DAILY
Qty: 60 TABLET | Refills: 2 | Status: SHIPPED | OUTPATIENT
Start: 2023-12-22

## 2023-12-22 RX ORDER — ROSUVASTATIN CALCIUM 5 MG/1
5 TABLET, COATED ORAL DAILY
Qty: 30 TABLET | Refills: 2 | Status: SHIPPED | OUTPATIENT
Start: 2023-12-22 | End: 2024-12-21

## 2023-12-22 RX ORDER — LORAZEPAM 2 MG/1
2 TABLET ORAL
Qty: 30 TABLET | Refills: 0 | Status: SHIPPED | OUTPATIENT
Start: 2023-12-22

## 2023-12-22 RX ORDER — LATANOPROST 50 UG/ML
1 SOLUTION/ DROPS OPHTHALMIC DAILY
Qty: 2.5 ML | Refills: 2 | Status: SHIPPED | OUTPATIENT
Start: 2023-12-22

## 2023-12-22 RX ORDER — TIMOLOL MALEATE 5 MG/ML
1 SOLUTION/ DROPS OPHTHALMIC 2 TIMES DAILY
Qty: 5 ML | Refills: 2 | Status: SHIPPED | OUTPATIENT
Start: 2023-12-22 | End: 2024-12-21

## 2023-12-22 RX ORDER — HYDROCHLOROTHIAZIDE 25 MG/1
25 TABLET ORAL DAILY
Qty: 30 TABLET | Refills: 2 | Status: SHIPPED | OUTPATIENT
Start: 2023-12-22

## 2023-12-22 RX ORDER — AMLODIPINE BESYLATE 5 MG/1
5 TABLET ORAL DAILY
Qty: 30 TABLET | Refills: 2 | Status: SHIPPED | OUTPATIENT
Start: 2023-12-22

## 2024-01-18 DIAGNOSIS — R73.01 IFG (IMPAIRED FASTING GLUCOSE): ICD-10-CM

## 2024-01-18 DIAGNOSIS — I10 HYPERTENSION, ESSENTIAL: Primary | ICD-10-CM

## 2024-01-20 DIAGNOSIS — Z00.6 ENCOUNTER FOR EXAMINATION FOR NORMAL COMPARISON OR CONTROL IN CLINICAL RESEARCH PROGRAM: ICD-10-CM

## 2024-01-23 ENCOUNTER — OFFICE VISIT (OUTPATIENT)
Dept: DENTISTRY | Facility: CLINIC | Age: 74
End: 2024-01-23

## 2024-01-23 DIAGNOSIS — Z01.20 ENCOUNTER FOR DENTAL EXAMINATION: Primary | ICD-10-CM

## 2024-01-23 PROCEDURE — D0140 LIMITED ORAL EVALUATION - PROBLEM FOCUSED: HCPCS

## 2024-01-23 PROCEDURE — D0230 INTRAORAL - PERIAPICAL EACH ADDITIONAL RADIOGRAPHIC IMAGE: HCPCS

## 2024-01-23 NOTE — DENTAL PROCEDURE DETAILS
Patient presents for limited oral evaluation    PMH reviewed, no changes, ASA II.     CC - my wife's tooth aches.    [employer] utilized ipad    Subjective history  Onset-1 month ago  Provocation-cold and chewing  Region-lower left, points to #20  Severity reported-8/10  Timing-comes and quickly goes away    Findings  Missing teeth  Majority of teeth except for #11 lingual appear sound and free of caries  #20, 21 appear sound and free of caries  Testing  20 - percussion normal, palpation normal, cold response normal  21 - percussion normal, palpation normal, cold response normal  22 - percussion normal, palpation normal, cold response normal  PA radiograph #20, 21 - no significant findings, no PARL, no signs of pathology  During attempting to take PA, patient seemed to have difficulty entering MIP and biting down. When asked to open/close repeatedly, patient did so but in a frail manner. Eventually was able to guide the patient into MIP and take the radiograph by manipulating her mandible. My overall impression is that muscular oral control does not seem entirely normal.  Hard and soft tissues WNL  TMJ assessment: WNL, no clicking or crepitus, no reported joint pain from her TMJ although she does have hand joint pain  Occlusal assessment: Even contacts, no hyperocclusion surfaces, Class I malocclusion  Perio assessment: Appears healthy and stable, no signs of inflammation, PD's 2-3 mm, minimal BOP    Informed patient of findings and absence of detectable abnormalities  Made patient aware of the possibility of non-odontogenic sources of dental pain including trigeminal issues or TMD.  Encouraged patient to try Sensodyne or home remedies, and return for comprehensive exam where we may re-evaluate and possibility refer to pain management specialist if needed.    Patient understood and left satisfied.    NV: Comp exam

## 2024-01-25 DIAGNOSIS — G47.09 OTHER INSOMNIA: ICD-10-CM

## 2024-01-25 DIAGNOSIS — E78.2 MIXED HYPERLIPIDEMIA: ICD-10-CM

## 2024-01-25 DIAGNOSIS — H40.10X4 OPEN-ANGLE GLAUCOMA OF BOTH EYES, INDETERMINATE STAGE, UNSPECIFIED OPEN-ANGLE GLAUCOMA TYPE: ICD-10-CM

## 2024-01-25 DIAGNOSIS — I10 HYPERTENSION, ESSENTIAL: ICD-10-CM

## 2024-01-26 ENCOUNTER — APPOINTMENT (OUTPATIENT)
Dept: LAB | Facility: CLINIC | Age: 74
End: 2024-01-26

## 2024-01-26 ENCOUNTER — HOSPITAL ENCOUNTER (OUTPATIENT)
Dept: RADIOLOGY | Facility: HOSPITAL | Age: 74
Discharge: HOME | End: 2024-01-26

## 2024-01-26 DIAGNOSIS — I10 HYPERTENSION, ESSENTIAL: ICD-10-CM

## 2024-01-26 DIAGNOSIS — Z00.6 ENCOUNTER FOR EXAMINATION FOR NORMAL COMPARISON OR CONTROL IN CLINICAL RESEARCH PROGRAM: ICD-10-CM

## 2024-01-26 DIAGNOSIS — R73.01 IFG (IMPAIRED FASTING GLUCOSE): ICD-10-CM

## 2024-01-26 DIAGNOSIS — A15.0 TUBERCULOSIS OF LUNG, INFILTRATIVE, BACTERIOLOGICAL OR HISTOLOGICAL EXAMINATION NOT DONE: ICD-10-CM

## 2024-01-26 LAB
ANION GAP SERPL CALCULATED.3IONS-SCNC: 8 MMOL/L
BUN SERPL-MCNC: 22 MG/DL (ref 5–25)
CALCIUM SERPL-MCNC: 9.8 MG/DL (ref 8.4–10.2)
CHLORIDE SERPL-SCNC: 102 MMOL/L (ref 96–108)
CO2 SERPL-SCNC: 30 MMOL/L (ref 21–32)
CREAT SERPL-MCNC: 0.89 MG/DL (ref 0.6–1.3)
EST. AVERAGE GLUCOSE BLD GHB EST-MCNC: 117 MG/DL
GFR SERPL CREATININE-BSD FRML MDRD: 64 ML/MIN/1.73SQ M
GLUCOSE P FAST SERPL-MCNC: 93 MG/DL (ref 65–99)
HBA1C MFR BLD: 5.7 %
POTASSIUM SERPL-SCNC: 3.4 MMOL/L (ref 3.5–5.3)
SODIUM SERPL-SCNC: 140 MMOL/L (ref 135–147)

## 2024-01-26 PROCEDURE — 71046 X-RAY EXAM CHEST 2 VIEWS: CPT

## 2024-01-26 PROCEDURE — 83036 HEMOGLOBIN GLYCOSYLATED A1C: CPT

## 2024-01-26 PROCEDURE — 36415 COLL VENOUS BLD VENIPUNCTURE: CPT

## 2024-01-26 PROCEDURE — 80048 BASIC METABOLIC PNL TOTAL CA: CPT

## 2024-01-26 NOTE — TELEPHONE ENCOUNTER
2024  Alyssa KRAUSE [facility] (supporting Jacqui Gaona PA-C)   MJ      24  5:32 PM  Hello  I am  not able to request refills in my chart. can you please refill all her meds thank you. it would be best if you give us 60 -90 days refill instead of having to  message you every month.   I would like all refills for Theodore Argueta as well  3/26/1951. Please let me know. Thank you

## 2024-01-29 RX ORDER — ROSUVASTATIN CALCIUM 5 MG/1
5 TABLET, COATED ORAL DAILY
Qty: 90 TABLET | Refills: 2 | Status: SHIPPED | OUTPATIENT
Start: 2024-01-29 | End: 2025-01-28

## 2024-01-29 RX ORDER — AMLODIPINE BESYLATE 5 MG/1
5 TABLET ORAL DAILY
Qty: 90 TABLET | Refills: 2 | Status: SHIPPED | OUTPATIENT
Start: 2024-01-29

## 2024-01-29 RX ORDER — TIMOLOL MALEATE 5 MG/ML
1 SOLUTION/ DROPS OPHTHALMIC 2 TIMES DAILY
Qty: 15 ML | Refills: 2 | Status: SHIPPED | OUTPATIENT
Start: 2024-01-29 | End: 2025-01-28

## 2024-01-29 RX ORDER — LATANOPROST 50 UG/ML
1 SOLUTION/ DROPS OPHTHALMIC DAILY
Qty: 7.5 ML | Refills: 2 | Status: SHIPPED | OUTPATIENT
Start: 2024-01-29

## 2024-01-29 RX ORDER — LORAZEPAM 2 MG/1
2 TABLET ORAL
Qty: 90 TABLET | Refills: 0 | Status: SHIPPED | OUTPATIENT
Start: 2024-01-29

## 2024-01-29 RX ORDER — HYDROCHLOROTHIAZIDE 25 MG/1
25 TABLET ORAL DAILY
Qty: 90 TABLET | Refills: 2 | Status: SHIPPED | OUTPATIENT
Start: 2024-01-29

## 2024-02-09 DIAGNOSIS — G89.29 CHRONIC BILATERAL LOW BACK PAIN WITHOUT SCIATICA: ICD-10-CM

## 2024-02-09 DIAGNOSIS — M54.50 CHRONIC BILATERAL LOW BACK PAIN WITHOUT SCIATICA: ICD-10-CM

## 2024-02-09 DIAGNOSIS — I10 HYPERTENSION, ESSENTIAL: Primary | ICD-10-CM

## 2024-02-12 DIAGNOSIS — M54.50 CHRONIC BILATERAL LOW BACK PAIN WITHOUT SCIATICA: ICD-10-CM

## 2024-02-12 DIAGNOSIS — G89.29 CHRONIC BILATERAL LOW BACK PAIN WITHOUT SCIATICA: ICD-10-CM

## 2024-02-21 LAB
APOB+LDLR+PCSK9 GENE MUT ANL BLD/T: NOT DETECTED
BRCA1+BRCA2 DEL+DUP + FULL MUT ANL BLD/T: NOT DETECTED
MLH1+MSH2+MSH6+PMS2 GN DEL+DUP+FUL M: NOT DETECTED

## 2024-04-03 ENCOUNTER — TELEPHONE (OUTPATIENT)
Dept: INTERNAL MEDICINE CLINIC | Facility: CLINIC | Age: 74
End: 2024-04-03

## 2024-04-03 ENCOUNTER — OFFICE VISIT (OUTPATIENT)
Dept: INTERNAL MEDICINE CLINIC | Facility: CLINIC | Age: 74
End: 2024-04-03

## 2024-04-03 VITALS
HEART RATE: 65 BPM | DIASTOLIC BLOOD PRESSURE: 86 MMHG | HEIGHT: 64 IN | SYSTOLIC BLOOD PRESSURE: 144 MMHG | BODY MASS INDEX: 24.31 KG/M2 | WEIGHT: 142.4 LBS | TEMPERATURE: 98.1 F

## 2024-04-03 DIAGNOSIS — E78.2 MIXED HYPERLIPIDEMIA: ICD-10-CM

## 2024-04-03 DIAGNOSIS — G89.29 CHRONIC BILATERAL LOW BACK PAIN WITHOUT SCIATICA: ICD-10-CM

## 2024-04-03 DIAGNOSIS — H40.10X4 OPEN-ANGLE GLAUCOMA OF BOTH EYES, INDETERMINATE STAGE, UNSPECIFIED OPEN-ANGLE GLAUCOMA TYPE: ICD-10-CM

## 2024-04-03 DIAGNOSIS — G47.09 OTHER INSOMNIA: ICD-10-CM

## 2024-04-03 DIAGNOSIS — Z23 ENCOUNTER FOR IMMUNIZATION: ICD-10-CM

## 2024-04-03 DIAGNOSIS — Z00.00 ANNUAL PHYSICAL EXAM: Primary | ICD-10-CM

## 2024-04-03 DIAGNOSIS — R73.03 PREDIABETES: ICD-10-CM

## 2024-04-03 DIAGNOSIS — I10 HYPERTENSION, ESSENTIAL: ICD-10-CM

## 2024-04-03 DIAGNOSIS — M54.50 CHRONIC BILATERAL LOW BACK PAIN WITHOUT SCIATICA: ICD-10-CM

## 2024-04-03 PROBLEM — R73.01 IFG (IMPAIRED FASTING GLUCOSE): Status: ACTIVE | Noted: 2024-04-03

## 2024-04-03 PROCEDURE — 99397 PER PM REEVAL EST PAT 65+ YR: CPT | Performed by: PHYSICIAN ASSISTANT

## 2024-04-03 PROCEDURE — 90750 HZV VACC RECOMBINANT IM: CPT | Performed by: PHYSICIAN ASSISTANT

## 2024-04-03 PROCEDURE — 90471 IMMUNIZATION ADMIN: CPT | Performed by: PHYSICIAN ASSISTANT

## 2024-04-03 RX ORDER — RIFAMPIN 300 MG/1
600 CAPSULE ORAL DAILY
COMMUNITY
Start: 2024-02-09 | End: 2024-06-08

## 2024-04-03 NOTE — ASSESSMENT & PLAN NOTE
Lumbar spine XR from 1/23/23: 5 projections of the lumbar spine were obtained. There is minimal scoliotic curvature with apex to the right centered at L3. There is minimal interspace narrowing at the right side of the L3/4 interspace and left side of the L2/3 interspace. There is generalized narrowing of the L4/L5 interspace. There is   moderate to severe narrowing at L5/S1. There is degenerative change of the apophyseal joints of L2/L3 and L3/L4 left greater than right.   Due to lack of insurance, declines PT at this time. Declines [employer] program as well. She has been taking diclofenac 50 mg 1-2 times daily which she states help. Previously also tried other medications such as naproxen which did not help. She would like to see pain management. She is concerned about cost, but will try to schedule. I did give her [facility]'s financial counselor information as well as the number for [facility] care.

## 2024-04-03 NOTE — ASSESSMENT & PLAN NOTE
Discussed general health recommendations and screening guidelines. Refuses cervical, breast, and colorectal cancer screening. Due to lack of coverage as well as patient refuses any invasive testing. Denies any symptoms other than chronic back pain. Declines screening lab work. Recommend routine dental and eye exams. Shingrix #1 given today. Tolerated well. Next dose in 2-6 months.

## 2024-04-03 NOTE — PROGRESS NOTES
ADULT ANNUAL PHYSICAL  [facility]    NAME: Alyssa Argueta  AGE: 73 y.o. SEX: female  : 1950     DATE: 4/3/2024     Assessment and Plan:     Problem List Items Addressed This Visit          Cardiovascular and Mediastinum    Hypertension, essential     BP mildly elevated today. Reviewed log that was brought from home. BP primarily 110s-130/70s. Bring BP cuff to next visit. Continue to monitor daily at home. Limit sodium and salt intake. Avoid alcohol and caffeine.             Eye    Open-angle glaucoma of both eyes, indeterminate stage     Reported history of glaucoma. I have been refilling her eye drops: timolol and latanoprost. She has not been able to see an eye doctor for follow up due to out of pocket costs. Her and her daughter will try to schedule as soon as possible.            Surgery/Wound/Pain    Chronic bilateral low back pain without sciatica     Lumbar spine XR from 23: 5 projections of the lumbar spine were obtained. There is minimal scoliotic curvature with apex to the right centered at L3. There is minimal interspace narrowing at the right side of the L3/4 interspace and left side of the L2/3 interspace. There is generalized narrowing of the L4/L5 interspace. There is   moderate to severe narrowing at L5/S1. There is degenerative change of the apophyseal joints of L2/L3 and L3/L4 left greater than right.   Due to lack of insurance, declines PT at this time. Declines [employer] program as well. She has been taking diclofenac 50 mg 1-2 times daily which she states help. Previously also tried other medications such as naproxen which did not help. She would like to see pain management. She is concerned about cost, but will try to schedule. I did give her [facility] financial counselor information as well as the number for [facility] care.            Relevant Orders    Ambulatory referral to Spine & Pain Management        Marshall Post called requesting a refill on the following medications:  Requested Prescriptions     Pending Prescriptions Disp Refills    blood glucose test strips (TRUE METRIX BLOOD GLUCOSE TEST) strip [Pharmacy Med Name: True Metrix Blood Glucose Test In Vitro Strip] 100 strip 11     Sig: TEST TWICE A DAY       Date of last visit: 7/27/2023  Date of next visit (if applicable):1/29/2024  Date of last refill: 12/22/2021  Pharmacy Name: [facility]ittaker,  Michael Fatima LPN Neurology/Sleep    Other insomnia     Initially patient presented on Ativan 2 mg every night. Decreased to 1 mg every night and then 0.5 mg every night. Tried doxepin 10 mg every night while trying to taper. Patient reported that doxepin did not help and made her dizzy. She could not sleep and went back up to 2 mg every night. She had a prescription left over from [facility]. It will be difficult to wean patient off of Ativan, especially without medication coverage. Patient and daughter worried about ineffectiveness of medications, side effects, and cost without insurance. Declines sleep medicine referral due to cost. In the meantime will continue 2 mg Ativan every night to prevent withdrawal and side effects. Patient and daughter understand risk. Will revisit next visit. Patient's daughter will continue to try to obtain medical coverage.               Other    Mixed hyperlipidemia     Lab Results   Component Value Date    CHOLESTEROL 175 07/21/2023     Lab Results   Component Value Date    HDL 79 07/21/2023     Lab Results   Component Value Date    TRIG 79 07/21/2023     Lab Results   Component Value Date    NONHDLC 96 07/21/2023      Lab Results   Component Value Date    LDLCALC 80 07/21/2023      ASCVD risk 22.2%. Continue rosuvastatin 5 mg daily. Low fat diet.          Encounter for immunization    Relevant Orders    Zoster Vaccine Recombinant IM (Completed)    Annual physical exam - Primary     Discussed general health recommendations and screening guidelines. Refuses cervical, breast, and colorectal cancer screening. Due to lack of coverage as well as patient refuses any invasive testing. Denies any symptoms other than chronic back pain. Declines screening lab work. Recommend routine dental and eye exams. Shingrix #1 given today. Tolerated well. Next dose in 2-6 months.          Prediabetes     Lab Results   Component Value Date    HGBA1C 5.7 (H) 01/26/2024      A1c has been 5.7%. Reviewed nutrition  recommendations. Will continue to monitor.            Immunizations and preventive care screenings were discussed with patient today. Appropriate education was printed on patient's after visit summary.    Counseling:  Alcohol/drug use: discussed moderation in alcohol intake, the recommendations for healthy alcohol use, and avoidance of illicit drug use.  Dental Health: discussed importance of regular tooth brushing, flossing, and dental visits.  Injury prevention: discussed safety/seat belts, safety helmets, smoke detectors, carbon dioxide detectors, and smoking near bedding or upholstery.  Sexual health: discussed sexually transmitted diseases, partner selection, use of condoms, avoidance of unintended pregnancy, and contraceptive alternatives.  Exercise: the importance of regular exercise/physical activity was discussed. Recommend exercise 3-5 times per week for at least 30 minutes.       Depression Screening and Follow-up Plan: Patient was screened for depression during today's encounter. They screened negative with a PHQ-2 score of 0.        Return in about 6 months (around 10/3/2024) for Recheck HTN, shingrix #2.     Chief Complaint:     Chief Complaint   Patient presents with    Physical Exam      History of Present Illness:     Adult Annual Physical   Patient here for a comprehensive physical exam. The patient reports no problems.    Diet and Physical Activity  Diet/Nutrition: well balanced diet, limited junk food, and consuming 3-5 servings of fruits/vegetables daily.   Exercise: no formal exercise.      Depression Screening  PHQ-2/9 Depression Screening    Little interest or pleasure in doing things: 0 - not at all  Feeling down, depressed, or hopeless: 0 - not at all  PHQ-2 Score: 0  PHQ-2 Interpretation: Negative depression screen       General Health  Sleep: sleeps well and gets 7-8 hours of sleep on average.   Hearing: normal - bilateral.  Vision: no vision problems, most recent eye exam >1 year ago,  and wears glasses.   Dental: regular dental visits and brushes teeth twice daily.       /GYN Health  Follows with gynecology? no   Patient is: postmenopausal  Last menstrual period: unknown  Contraceptive method: menopause.    Advanced Care Planning  Do you have an advanced directive? no  Do you have a durable medical power of ? no  ACP document given to the patient? no     Review of Systems:     Review of Systems   Constitutional:  Negative for appetite change, chills, diaphoresis, fatigue, fever and unexpected weight change.   HENT:  Negative for congestion, dental problem, hearing loss, sore throat, tinnitus and trouble swallowing.    Eyes:  Negative for visual disturbance.   Respiratory:  Negative for cough, chest tightness, shortness of breath and wheezing.    Cardiovascular:  Negative for chest pain, palpitations and leg swelling.   Gastrointestinal:  Negative for abdominal pain, blood in stool, constipation, diarrhea, nausea and vomiting.   Endocrine: Negative for cold intolerance, heat intolerance, polydipsia, polyphagia and polyuria.   Musculoskeletal:  Positive for back pain (chronic) and gait problem (due to pain).   Skin:  Negative for rash.   Neurological:  Negative for dizziness, tremors, weakness, light-headedness, numbness and headaches.   Hematological:  Negative for adenopathy.   Psychiatric/Behavioral:  Negative for dysphoric mood, self-injury, sleep disturbance and suicidal ideas. The patient is not nervous/anxious.       Past Medical History:     Past Medical History:   Diagnosis Date    Arthritis     Hypertension     Scoliosis       Past Surgical History:     Past Surgical History:   Procedure Laterality Date    TUMOR REMOVAL      right ear      Social History:     Social History     Socioeconomic History    Marital status: /Civil Union     Spouse name: Theodore    Number of children: None    Years of education: None    Highest education level: None   Occupational History    None    Tobacco Use    Smoking status: Never     Passive exposure: Never    Smokeless tobacco: Never   Vaping Use    Vaping status: Never Used   Substance and Sexual Activity    Alcohol use: Never    Drug use: Never    Sexual activity: None   Other Topics Concern    None   Social History Narrative    None     Social Determinants of Health     Financial Resource Strain: Low Risk  (4/3/2024)    Overall Financial Resource Strain (CARDIA)     Difficulty of Paying Living Expenses: Not hard at all   Food Insecurity: No Food Insecurity (4/3/2024)    Hunger Vital Sign     Worried About Running Out of Food in the Last Year: Never true     Ran Out of Food in the Last Year: Never true   Transportation Needs: No Transportation Needs (4/3/2024)    PRAPARE - Transportation     Lack of Transportation (Medical): No     Lack of Transportation (Non-Medical): No   Physical Activity: Not on file   Stress: Not on file   Social Connections: Not on file   Intimate Partner Violence: Not on file   Housing Stability: Low Risk  (4/3/2024)    Housing Stability Vital Sign     Unable to Pay for Housing in the Last Year: No     Number of Places Lived in the Last Year: 2     Unstable Housing in the Last Year: No      Family History:     History reviewed. No pertinent family history.   Current Medications:     Current Outpatient Medications   Medication Sig Dispense Refill    acetaminophen (TYLENOL) 500 mg tablet Take 500 mg by mouth 2 (two) times a day      amLODIPine (NORVASC) 5 mg tablet Take 1 tablet (5 mg total) by mouth daily 90 tablet 2    diclofenac sodium (VOLTAREN) 50 mg EC tablet Take 1 tablet (50 mg total) by mouth 2 (two) times a day 180 tablet 3    hydroCHLOROthiazide 25 mg tablet Take 1 tablet (25 mg total) by mouth daily 90 tablet 2    latanoprost (XALATAN) 0.005 % ophthalmic solution Administer 1 drop to both eyes daily 7.5 mL 2    LORazepam (ATIVAN) 2 mg tablet Take 1 tablet (2 mg total) by mouth daily at bedtime 90 tablet 0    rifampin  "(RIFADIN) 300 mg capsule Take 600 mg by mouth daily      rosuvastatin (CRESTOR) 5 mg tablet Take 1 tablet (5 mg total) by mouth daily 90 tablet 2    timolol (TIMOPTIC) 0.5 % ophthalmic solution Apply 1 drop to eye 2 (two) times a day 15 mL 2     No current facility-administered medications for this visit.      Allergies:     No Known Allergies   Physical Exam:     /86 (BP Location: Right arm, Patient Position: Sitting, Cuff Size: Large)   Pulse 65   Temp 98.1 °F (36.7 °C) (Temporal)   Ht 5' 4\" (1.626 m)   Wt 64.6 kg (142 lb 6.4 oz)   BMI 24.44 kg/m²     Physical Exam  Vitals and nursing note reviewed.   Constitutional:       General: She is awake. She is not in acute distress.     Appearance: Normal appearance. She is well-developed, well-groomed and normal weight. She is not ill-appearing.   HENT:      Head: Normocephalic and atraumatic.      Right Ear: Tympanic membrane, ear canal and external ear normal.      Left Ear: Tympanic membrane, ear canal and external ear normal.      Nose: Nose normal.      Mouth/Throat:      Lips: Pink.      Mouth: Mucous membranes are moist.      Pharynx: Oropharynx is clear. Uvula midline. No oropharyngeal exudate or posterior oropharyngeal erythema.   Eyes:      General: No scleral icterus.     Extraocular Movements: Extraocular movements intact.      Conjunctiva/sclera: Conjunctivae normal.      Pupils: Pupils are equal, round, and reactive to light.   Neck:      Vascular: No carotid bruit, hepatojugular reflux or JVD.   Cardiovascular:      Rate and Rhythm: Normal rate and regular rhythm.      Pulses: Normal pulses.           Radial pulses are 2+ on the right side and 2+ on the left side.      Heart sounds: Normal heart sounds. No murmur heard.  Pulmonary:      Effort: Pulmonary effort is normal. No respiratory distress.      Breath sounds: Normal breath sounds and air entry. No decreased air movement. No decreased breath sounds, wheezing, rhonchi or rales.   Abdominal: "      General: Abdomen is flat. Bowel sounds are normal. There is no distension.      Palpations: Abdomen is soft. There is no mass.      Tenderness: There is no abdominal tenderness. There is no guarding or rebound.      Hernia: No hernia is present.   Musculoskeletal:         General: Normal range of motion.      Cervical back: Neck supple.      Right lower leg: No edema.      Left lower leg: No edema.   Lymphadenopathy:      Cervical: No cervical adenopathy.   Skin:     General: Skin is warm.      Coloration: Skin is not jaundiced.      Findings: No rash.   Neurological:      General: No focal deficit present.      Mental Status: She is alert and oriented to person, place, and time. Mental status is at baseline.      Gait: Gait abnormal (uses cane).   Psychiatric:         Attention and Perception: Attention normal.         Mood and Affect: Mood and affect normal.         Speech: Speech normal.         Behavior: Behavior normal. Behavior is cooperative.          Jacqui Gaona PA-C  [facility]

## 2024-04-03 NOTE — ASSESSMENT & PLAN NOTE
Initially patient presented on Ativan 2 mg every night. Decreased to 1 mg every night and then 0.5 mg every night. Tried doxepin 10 mg every night while trying to taper. Patient reported that doxepin did not help and made her dizzy. She could not sleep and went back up to 2 mg every night. She had a prescription left over from [facility]. It will be difficult to wean patient off of Ativan, especially without medication coverage. Patient and daughter worried about ineffectiveness of medications, side effects, and cost without insurance. Declines sleep medicine referral due to cost. In the meantime will continue 2 mg Ativan every night to prevent withdrawal and side effects. Patient and daughter understand risk. Will revisit next visit. Patient's daughter will continue to try to obtain medical coverage.

## 2024-04-03 NOTE — ASSESSMENT & PLAN NOTE
Reported history of glaucoma. I have been refilling her eye drops: timolol and latanoprost. She has not been able to see an eye doctor for follow up due to out of pocket costs. Her and her daughter will try to schedule as soon as possible.

## 2024-04-03 NOTE — ASSESSMENT & PLAN NOTE
BP mildly elevated today. Reviewed log that was brought from home. BP primarily 110s-130/70s. Bring BP cuff to next visit. Continue to monitor daily at home. Limit sodium and salt intake. Avoid alcohol and caffeine.

## 2024-04-03 NOTE — ASSESSMENT & PLAN NOTE
Lab Results   Component Value Date    HGBA1C 5.7 (H) 01/26/2024      A1c has been 5.7%. Reviewed nutrition recommendations. Will continue to monitor.

## 2024-04-03 NOTE — ASSESSMENT & PLAN NOTE
Lab Results   Component Value Date    CHOLESTEROL 175 07/21/2023     Lab Results   Component Value Date    HDL 79 07/21/2023     Lab Results   Component Value Date    TRIG 79 07/21/2023     Lab Results   Component Value Date    NONHDLC 96 07/21/2023      Lab Results   Component Value Date    LDLCALC 80 07/21/2023      ASCVD risk 22.2%. Continue rosuvastatin 5 mg daily. Low fat diet.

## 2024-04-12 ENCOUNTER — OFFICE VISIT (OUTPATIENT)
Dept: DENTISTRY | Facility: CLINIC | Age: 74
End: 2024-04-12

## 2024-04-12 VITALS — SYSTOLIC BLOOD PRESSURE: 185 MMHG | HEART RATE: 102 BPM | DIASTOLIC BLOOD PRESSURE: 98 MMHG

## 2024-04-12 DIAGNOSIS — Z01.20 ENCOUNTER FOR DENTAL EXAMINATION: Primary | ICD-10-CM

## 2024-04-12 NOTE — DENTAL PROCEDURE DETAILS
Comprehensive Exam    Alyssa Argueta 73 y.o. female presents with  daughter for translation  to [place] for comprehensive exam.  PMH reviewed, no changes, ASA II. Significant medical history: N. Significant allergies: NN. Significant medications: N.  Pain level 0/10    Chief complaint:   Patient seeks comprehensive dental care    Consent:  Reviewed procedures involved with comprehensive exam including radiographs, oral exam, and periodontal probing.   Patient understands and consents.    Radiographs: FMX    Periodontal exam:  Hygiene - Excellent  Plaque - Mild  Horizontal bone loss  UR - None  UL - None  LL - None  LR - None  Vertical bone loss - None  Subgingival calculus - Localized  BOP - Localized  Mobility - None  Furcation involvements - None  Occlusal trauma - None  Smoker - No  Diabetic - No  Periodontal Stage: Healthy  Periodontal Plan: Prophy     Caries exam:   #14 OL recurrent   Teeth with high change of needing RCT? None    Oral cancer screening: No abnormalities detected  Soft tissues exam: Within normal limits  Hard tissues exam: Within normal limits    Occlusal assessment:  VDO / restorative space - Normal   AP Classification -   Right: Canine Class I; Molar Class I  Left: Canine Class I; Molar Class I  Supra-eruptions or drifting - None  Crossbites - None  Recommended for orthodontic referral? No  Recommended for orthodontic consult at [place]? No    Tx plan:  Tx plan able to be determined at comp exam.  Recommended recall schedule: 6 months.    NV: Prophy (hygiene)  NV2: #14 OL (resident).    Attending: Dr. Singh.

## 2024-04-16 ENCOUNTER — OFFICE VISIT (OUTPATIENT)
Dept: DENTISTRY | Facility: CLINIC | Age: 74
End: 2024-04-16

## 2024-04-16 VITALS — HEART RATE: 74 BPM | SYSTOLIC BLOOD PRESSURE: 132 MMHG | DIASTOLIC BLOOD PRESSURE: 64 MMHG

## 2024-04-16 DIAGNOSIS — Z01.21 ENCOUNTER FOR DENTAL EXAMINATION AND CLEANING WITH ABNORMAL FINDINGS: Primary | ICD-10-CM

## 2024-04-16 PROCEDURE — D1110 PROPHYLAXIS - ADULT: HCPCS

## 2024-04-16 NOTE — DENTAL PROCEDURE DETAILS
Prophylaxis completed with ultrasonic  and hand instrumentation.  Soft plaque removed and supragingival calculus removed from all quads.  Polished with prophy cup and paste.  Flossed and provided Oral Health Instructions.  Demonstrated proper brushing and flossing technique.  Patient left satisfied and ambulatory.     ADULT PROPHY    REVIEWED MED HX: meds, allergies, health changes reviewed in [facility]. All consents signed.  CHIEF CONCERN:  none   PAIN SCALE:  0  ASA CLASS:  II  PLAQUE: moderate     CALCULUS:   Heavy black sub calc in all quads. 10 + years since last prophy.  BLEEDING: heavy  STAIN :  heavy     ORAL HYGIENE:  fair    PERIO: Re take perio charting at NV.    Hand scaled, polished and flossed. Used Cavitron.     Oral Hygiene Instruction:  recommended brushing 2 x daily for 2 minutes MIN, recommended flossing daily, reviewed dietary precautions.  Dispensed: toothbrush, toothpaste and floss    Visual and Tactile Intraoral/ Extraoral evaluation: Oral and Oropharyngeal cancer evaluation. No findings       REFERRALS: no referrals provided         TREATMENT  PLAN :   1) #14-OL Resin    Next Recall: 6 month recall with periodic exam and PC

## 2024-04-23 ENCOUNTER — OFFICE VISIT (OUTPATIENT)
Dept: DENTISTRY | Facility: CLINIC | Age: 74
End: 2024-04-23

## 2024-04-23 VITALS — HEART RATE: 61 BPM | SYSTOLIC BLOOD PRESSURE: 121 MMHG | DIASTOLIC BLOOD PRESSURE: 77 MMHG

## 2024-04-23 DIAGNOSIS — K02.62 CARIES OF DENTIN: Primary | ICD-10-CM

## 2024-04-23 PROCEDURE — D2392 RESIN-BASED COMPOSITE - 2 SURFACES, POSTERIOR: HCPCS

## 2024-04-23 NOTE — DENTAL PROCEDURE DETAILS
Composite Restoration #14-LO    Alyssa Argueta 73 y.o. female presents with daughter to [place] for composite restoration  PMH reviewed, no changes, ASA II.   Pain level 0/10  Treatment consents signed: Yes  Perio: Gingivitis (PC not completed at comp exam   Caries Assessment: Low    Radiographs: Films are current (FMX: 4/12/24)  Oral Hygiene instruction reviewed and given  Hygiene recall visits recommended to the patient    Diagnosis:  Fractured amalgam restoration and recurrent caries #14-OL    Prognosis:  good    Consent:  Reviewed diagnosis of caries and tx plan for composite restorations.  Risks of specific procedure: need for RCT if pulp exposure occurs or in future if pulp is inflamed, need to revise tx plan based on extent of decay, damage to adjacent tooth and/or restoration.  Risks of any dental procedure: post procedural pain or sensitivity, local anesthetic side effects, allergic reaction to dental materials and medications, breakage of local anesthetic needle, aspiration of small dental tools, injury to nearby hard and soft tissues and anatomical structures.  Benefits: prevent further breakdown of tooth and its sequelae.  Alternatives: no tx.  Patient understands and consents.    Anesthesia:  Topical 20% benzocaine.  0.5 carps 4% Septocaine 1:100k epi via buccal infiltration.    Procedure details:  Isolation: Cotton rolls and High volume suction  Prepped teeth #14-LO and removed existing amalgam restoration with high speed handpiece.  Caries removed with round carbide on slow speed.  Etch with 37% H2PO4 15 seconds. Rinsed and suctioned.  Applied Ivoclar Adhesive universal  with 20 second scrub, air dried, and light cured.  Restored with Ivoclar Tetric Evoceram and Tetric Evoflow Shade A2 and light cured.  #14 is not in occlusion. Refined with finishing burs.  Polished with enhance point.    Patient dismissed ambulatory and alert.  Attending: Dr. Diallo  NV: 6 month recall with periodic  exam and PAMELA Williamson

## 2024-04-26 DIAGNOSIS — G47.09 OTHER INSOMNIA: ICD-10-CM

## 2024-04-29 RX ORDER — LORAZEPAM 2 MG/1
2 TABLET ORAL
Qty: 90 TABLET | Refills: 0 | Status: SHIPPED | OUTPATIENT
Start: 2024-04-29

## 2024-07-21 DIAGNOSIS — G47.09 OTHER INSOMNIA: ICD-10-CM

## 2024-07-22 RX ORDER — LORAZEPAM 2 MG/1
2 TABLET ORAL
Qty: 90 TABLET | Refills: 0 | Status: SHIPPED | OUTPATIENT
Start: 2024-07-22

## 2024-07-22 NOTE — TELEPHONE ENCOUNTER
PDMP reviewed. Last filled on 4/30/24. Due for refill on 7/29/24. Patient requesting refill stating she is going away for 3 months.

## 2024-10-13 DIAGNOSIS — G47.09 OTHER INSOMNIA: ICD-10-CM

## 2024-10-19 DIAGNOSIS — H40.10X4 OPEN-ANGLE GLAUCOMA OF BOTH EYES, INDETERMINATE STAGE, UNSPECIFIED OPEN-ANGLE GLAUCOMA TYPE: ICD-10-CM

## 2024-10-21 RX ORDER — LATANOPROST 50 UG/ML
1 SOLUTION/ DROPS OPHTHALMIC DAILY
Qty: 7.5 ML | Refills: 0 | Status: SHIPPED | OUTPATIENT
Start: 2024-10-21

## 2024-10-21 RX ORDER — TIMOLOL MALEATE 5 MG/ML
1 SOLUTION/ DROPS OPHTHALMIC 2 TIMES DAILY
Qty: 15 ML | Refills: 0 | Status: SHIPPED | OUTPATIENT
Start: 2024-10-21 | End: 2025-03-20

## 2024-10-22 ENCOUNTER — OFFICE VISIT (OUTPATIENT)
Dept: DENTISTRY | Facility: CLINIC | Age: 74
End: 2024-10-22

## 2024-10-22 VITALS — SYSTOLIC BLOOD PRESSURE: 163 MMHG | DIASTOLIC BLOOD PRESSURE: 96 MMHG | HEART RATE: 93 BPM

## 2024-10-22 DIAGNOSIS — Z01.21 ENCOUNTER FOR DENTAL EXAMINATION AND CLEANING WITH ABNORMAL FINDINGS: Primary | ICD-10-CM

## 2024-10-22 PROCEDURE — D1110 PROPHYLAXIS - ADULT: HCPCS

## 2024-10-22 PROCEDURE — D1330 ORAL HYGIENE INSTRUCTIONS: HCPCS

## 2024-10-22 PROCEDURE — D0120 PERIODIC ORAL EVALUATION - ESTABLISHED PATIENT: HCPCS

## 2024-10-22 NOTE — DENTAL PROCEDURE DETAILS
Prophylaxis completed with ultrasonic  and hand instrumentation.  Soft plaque removed and supragingival calculus removed from all quads.  Polished with prophy cup and paste.  Flossed and provided Oral Health Instructions.  Demonstrated proper brushing and flossing technique.  Patient left satisfied and ambulatory.         PERIODIC EXAM, ADULT PROPHY , (no xrays due ),OHI   REVIEWED MED HX: meds, allergies, health changes reviewed in [facility]. All consents signed.  CHIEF CONCERN: no dental pain or concerns  PAIN SCALE:  0  ASA CLASS:  II  PLAQUE:  moderate  CALCULUS:  moderate  BLEEDING:   moderate  STAIN :   moderate      PERIO:  Full perio charting completed today    Hygiene Procedures:  Scaled, Polished, Flossed and Used Cavitron    Oral Hygiene Instruction: Brushing Minimum 2x daily for 2 minutes, daily flossing, Water pik, Interproximal Brush, and Electric toothbrush    Dispensed: Toothbrush, Toothpaste, Floss, and Flossers    Visual and Tactile Intraoral/ Extraoral evaluation: Oral and Oropharyngeal cancer evaluation. No findings     Dr. Lorenzo Bragg   Reviewed with patient clinical and radiographic findings and patient verbalized understanding. All questions and concerns addressed.     REFERRALS: none    CARIES FINDINGS: #14-DO       TREATMENT  PLAN :   NV:  #14-DO resin    Next Recall: 6 month recall with periodic exam and 4 BWX     Last  FMX : 4/12/2024

## 2024-10-23 RX ORDER — LORAZEPAM 2 MG/1
2 TABLET ORAL
Qty: 90 TABLET | Refills: 0 | Status: SHIPPED | OUTPATIENT
Start: 2024-10-23

## 2025-01-01 DIAGNOSIS — H40.10X4 OPEN-ANGLE GLAUCOMA OF BOTH EYES, INDETERMINATE STAGE, UNSPECIFIED OPEN-ANGLE GLAUCOMA TYPE: ICD-10-CM

## 2025-01-02 RX ORDER — TIMOLOL MALEATE 5 MG/ML
SOLUTION/ DROPS OPHTHALMIC
Qty: 15 ML | Refills: 0 | Status: SHIPPED | OUTPATIENT
Start: 2025-01-02

## 2025-01-02 RX ORDER — LATANOPROST 50 UG/ML
SOLUTION/ DROPS OPHTHALMIC
Qty: 7.5 ML | Refills: 0 | Status: SHIPPED | OUTPATIENT
Start: 2025-01-02

## 2025-01-03 ENCOUNTER — OFFICE VISIT (OUTPATIENT)
Dept: INTERNAL MEDICINE CLINIC | Facility: CLINIC | Age: 75
End: 2025-01-03

## 2025-01-03 VITALS
HEART RATE: 69 BPM | WEIGHT: 146 LBS | TEMPERATURE: 98.2 F | HEIGHT: 64 IN | DIASTOLIC BLOOD PRESSURE: 82 MMHG | BODY MASS INDEX: 24.92 KG/M2 | SYSTOLIC BLOOD PRESSURE: 134 MMHG

## 2025-01-03 DIAGNOSIS — I10 HYPERTENSION, ESSENTIAL: Primary | ICD-10-CM

## 2025-01-03 DIAGNOSIS — G89.29 CHRONIC BILATERAL LOW BACK PAIN WITHOUT SCIATICA: ICD-10-CM

## 2025-01-03 DIAGNOSIS — E78.2 MIXED HYPERLIPIDEMIA: ICD-10-CM

## 2025-01-03 DIAGNOSIS — Z11.59 ENCOUNTER FOR HEPATITIS C SCREENING TEST FOR LOW RISK PATIENT: ICD-10-CM

## 2025-01-03 DIAGNOSIS — E66.3 OVERWEIGHT WITH BODY MASS INDEX (BMI) OF 25 TO 25.9 IN ADULT: ICD-10-CM

## 2025-01-03 DIAGNOSIS — M54.50 CHRONIC BILATERAL LOW BACK PAIN WITHOUT SCIATICA: ICD-10-CM

## 2025-01-03 DIAGNOSIS — G47.09 OTHER INSOMNIA: ICD-10-CM

## 2025-01-03 DIAGNOSIS — H40.1134 PRIMARY OPEN ANGLE GLAUCOMA (POAG) OF BOTH EYES, INDETERMINATE STAGE: ICD-10-CM

## 2025-01-03 DIAGNOSIS — R73.03 PREDIABETES: ICD-10-CM

## 2025-01-03 PROCEDURE — 99214 OFFICE O/P EST MOD 30 MIN: CPT | Performed by: PHYSICIAN ASSISTANT

## 2025-01-03 RX ORDER — LORAZEPAM 2 MG/1
2 TABLET ORAL
Qty: 90 TABLET | Refills: 0 | Status: SHIPPED | OUTPATIENT
Start: 2025-01-03

## 2025-01-06 PROBLEM — Z23 ENCOUNTER FOR IMMUNIZATION: Status: RESOLVED | Noted: 2023-07-01 | Resolved: 2025-01-06

## 2025-01-06 PROBLEM — Z00.00 ANNUAL PHYSICAL EXAM: Status: RESOLVED | Noted: 2024-04-03 | Resolved: 2025-01-06

## 2025-01-06 NOTE — ASSESSMENT & PLAN NOTE
Lab Results   Component Value Date    HGBA1C 5.7 (H) 01/26/2024      History of hyperglycemia and prediabetes. Will recheck A1c.   Orders:    Comprehensive metabolic panel; Future    Hemoglobin A1C; Future

## 2025-01-06 NOTE — ASSESSMENT & PLAN NOTE
Lumbar spine XR from 1/23/23: 5 projections of the lumbar spine were obtained. There is minimal scoliotic curvature with apex to the right centered at L3. There is minimal interspace narrowing at the right side of the L3/4 interspace and left side of the L2/3 interspace. There is generalized narrowing of the L4/L5 interspace. There is moderate to severe narrowing at L5/S1. There is degenerative change of the apophyseal joints of L2/L3 and L3/L4 left greater than right.   Due to lack of insurance, declines PT at this time. Declines [employer] program as well. She has been taking diclofenac 50 mg 1-2 times daily which she states help. Previously also tried other medications such as naproxen which did not help. She would like to see pain management, but concerned about cost. Believes she will have coverage starting in February and will be open to seeing pain management at that time.

## 2025-01-06 NOTE — PROGRESS NOTES
Name: Alyssa Argueta      : 1950      MRN: [id number]  Encounter Provider: Jacqui Corey PA-C  Encounter Date: 1/3/2025   Encounter department: [facility]  :  Assessment & Plan  Hypertension, essential  BP Readings from Last 3 Encounters:   25 134/82   10/22/24 163/96   24 121/77      Reviewed log that was brought from home. BP primarily 110s-130/70s. Bring BP cuff to next visit. Continue to monitor daily at home. Continue HCTZ 25 mg once daily and amlodipine 5 mg once daily.  Will check renal function and electrolytes. Limit sodium and salt intake. Avoid alcohol and caffeine.   Orders:    CBC and differential; Future    TSH, 3rd generation with Free T4 reflex; Future    Mixed hyperlipidemia  Lab Results   Component Value Date    CHOLESTEROL 175 2023     Lab Results   Component Value Date    HDL 79 2023     Lab Results   Component Value Date    TRIG 79 2023     Lab Results   Component Value Date    NONHDLC 96 2023      Lab Results   Component Value Date    LDLCALC 80 2023      ASCVD risk 20.2%. Continue rosuvastatin 5 mg daily. Low fat diet. Will recheck lipid panel.  Orders:    Lipid panel; Future    Prediabetes  Lab Results   Component Value Date    HGBA1C 5.7 (H) 2024      History of hyperglycemia and prediabetes. Will recheck A1c.   Orders:    Comprehensive metabolic panel; Future    Hemoglobin A1C; Future    Other insomnia  Initially patient presented on Ativan 2 mg every night. Decreased to 1 mg every night and then 0.5 mg every night. Tried doxepin 10 mg every night while trying to taper. Patient reported that doxepin did not help and made her dizzy. She could not sleep and went back up to 2 mg every night. She had a prescription left over from [facility]. It will be difficult to wean patient off of Ativan, especially without medication coverage. Patient and daughter worried about ineffectiveness of medications,  side effects, and cost without insurance. Declines sleep medicine referral due to cost. In the meantime will continue 2 mg Ativan every night to prevent withdrawal and side effects. Patient and daughter understand risk. Will revisit next visit. Patient's daughter believes she will have medical coverage starting in February.  Orders:    LORazepam (ATIVAN) 2 mg tablet; Take 1 tablet (2 mg total) by mouth daily at bedtime    Chronic bilateral low back pain without sciatica  Lumbar spine XR from 1/23/23: 5 projections of the lumbar spine were obtained. There is minimal scoliotic curvature with apex to the right centered at L3. There is minimal interspace narrowing at the right side of the L3/4 interspace and left side of the L2/3 interspace. There is generalized narrowing of the L4/L5 interspace. There is moderate to severe narrowing at L5/S1. There is degenerative change of the apophyseal joints of L2/L3 and L3/L4 left greater than right.   Due to lack of insurance, declines PT at this time. Declines [employer] program as well. She has been taking diclofenac 50 mg 1-2 times daily which she states help. Previously also tried other medications such as naproxen which did not help. She would like to see pain management, but concerned about cost. Believes she will have coverage starting in February and will be open to seeing pain management at that time.        Primary open angle glaucoma (POAG) of both eyes, indeterminate stage  Reported history of glaucoma. I have been refilling her eye drops: timolol and latanoprost. She has not been able to see an eye doctor for follow up due to out of pocket costs. Her and her daughter will try to schedule as soon as possible.        Encounter for hepatitis C screening test for low risk patient  Orders:    Hepatitis C antibody; Future    Overweight with body mass index (BMI) of 25 to 25.9 in adult           BMI Counseling: Body mass index is 25.06 kg/m². The BMI is above normal. Nutrition  recommendations include decreasing portion sizes, encouraging healthy choices of fruits and vegetables, decreasing fast food intake, consuming healthier snacks, limiting drinks that contain sugar, moderation in carbohydrate intake, increasing intake of lean protein, reducing intake of saturated and trans fat and reducing intake of cholesterol. Exercise recommendations include moderate physical activity 150 minutes/week, exercising 3-5 times per week and strength training exercises. No pharmacotherapy was ordered. Rationale for BMI follow-up plan is due to patient being overweight or obese.     Depression Screening and Follow-up Plan: Patient was screened for depression during today's encounter. They screened negative with a PHQ-2 score of 0.      History of Present Illness     Patient is a 72 year old female with a PMH of HTN, HLD, and insomnia presenting for follow up . She is here with her daughter. Her  and her moved here from [facility] almost two years ago. They are currently staying with their daughter. She states overall she is feeling well. She tried to wean off of Ativan at her previous visit and start doxepin instead. States it made her dizzy and she could not sleep. She went back to 2 mg of ativan daily at bedtime.   She does have chronic back pain. States it has been present for a long time. She has been seen for this before. She was told she has scoliosis. They tried diclofenac and naproxen without relief. She states it is primarily her mid and low back. Denies any injuries or trauma. Denies redness, swelling, or warmth. Denies numbness, tingling, or weakness. The pain does not radiate. She can not describe the pain. Currently 8/10.   Patient's daughter interpreted during visit. Declined formal  services.      Review of Systems   Constitutional:  Negative for appetite change, chills, diaphoresis, fatigue, fever and unexpected weight change.   HENT:  Negative for congestion, dental problem,  "hearing loss, sore throat, tinnitus and trouble swallowing.    Eyes:  Negative for visual disturbance.   Respiratory:  Negative for cough, chest tightness, shortness of breath and wheezing.    Cardiovascular:  Negative for chest pain, palpitations and leg swelling.   Gastrointestinal:  Negative for abdominal pain, blood in stool, constipation, diarrhea, nausea and vomiting.   Endocrine: Negative for cold intolerance, heat intolerance, polydipsia, polyphagia and polyuria.   Musculoskeletal:  Positive for back pain (chronic) and gait problem (due to pain).   Skin:  Negative for rash.   Neurological:  Negative for dizziness, tremors, weakness, light-headedness, numbness and headaches.   Hematological:  Negative for adenopathy.   Psychiatric/Behavioral:  Negative for dysphoric mood, self-injury, sleep disturbance and suicidal ideas. The patient is not nervous/anxious.        Objective   /82 (BP Location: Left arm, Patient Position: Sitting, Cuff Size: Large)   Pulse 69   Temp 98.2 °F (36.8 °C) (Temporal)   Ht 5' 4\" (1.626 m)   Wt 66.2 kg (146 lb)   BMI 25.06 kg/m²      Physical Exam  Vitals and nursing note reviewed.   Constitutional:       General: She is awake. She is not in acute distress.     Appearance: Normal appearance. She is well-developed, well-groomed and normal weight. She is not ill-appearing.   HENT:      Head: Normocephalic and atraumatic.      Right Ear: Tympanic membrane, ear canal and external ear normal.      Left Ear: Tympanic membrane, ear canal and external ear normal.      Nose: Nose normal.      Mouth/Throat:      Lips: Pink.      Mouth: Mucous membranes are moist.      Pharynx: Oropharynx is clear. Uvula midline. No oropharyngeal exudate or posterior oropharyngeal erythema.   Eyes:      General: No scleral icterus.     Extraocular Movements: Extraocular movements intact.      Conjunctiva/sclera: Conjunctivae normal.      Pupils: Pupils are equal, round, and reactive to light.   Neck: "      Vascular: No carotid bruit, hepatojugular reflux or JVD.   Cardiovascular:      Rate and Rhythm: Normal rate and regular rhythm.      Pulses: Normal pulses.           Radial pulses are 2+ on the right side and 2+ on the left side.      Heart sounds: Normal heart sounds. No murmur heard.  Pulmonary:      Effort: Pulmonary effort is normal. No respiratory distress.      Breath sounds: Normal breath sounds and air entry. No decreased air movement. No decreased breath sounds, wheezing, rhonchi or rales.   Abdominal:      General: Abdomen is flat. Bowel sounds are normal. There is no distension.      Palpations: Abdomen is soft. There is no mass.      Tenderness: There is no abdominal tenderness. There is no guarding or rebound.      Hernia: No hernia is present.   Musculoskeletal:      Cervical back: Neck supple.      Lumbar back: Deformity (kyphosis) present. No swelling, edema, signs of trauma, lacerations, spasms, tenderness or bony tenderness. Normal range of motion. Positive left straight leg raise test. Negative right straight leg raise test. No scoliosis.      Right lower leg: No edema.      Left lower leg: No edema.   Lymphadenopathy:      Cervical: No cervical adenopathy.   Skin:     General: Skin is warm.      Coloration: Skin is not jaundiced.      Findings: No rash.   Neurological:      General: No focal deficit present.      Mental Status: She is alert and oriented to person, place, and time. Mental status is at baseline.      Gait: Gait abnormal (uses cane).   Psychiatric:         Attention and Perception: Attention normal.         Mood and Affect: Mood and affect normal.         Speech: Speech normal.         Behavior: Behavior normal. Behavior is cooperative.       Administrative Statements   I have spent a total time of 30 minutes in caring for this patient on the day of the visit/encounter including Diagnostic results, Prognosis, Risks and benefits of tx options, Instructions for management,  Patient and family education, Importance of tx compliance, Risk factor reductions, Impressions, Counseling / Coordination of care, Reviewing / ordering tests, medicine, procedures  , and Obtaining or reviewing history  . Topics discussed with the patient / family include symptom assessment and management, medication review, goals of care, supportive listening, and anticipatory guidance.

## 2025-01-06 NOTE — ASSESSMENT & PLAN NOTE
Initially patient presented on Ativan 2 mg every night. Decreased to 1 mg every night and then 0.5 mg every night. Tried doxepin 10 mg every night while trying to taper. Patient reported that doxepin did not help and made her dizzy. She could not sleep and went back up to 2 mg every night. She had a prescription left over from [facility]. It will be difficult to wean patient off of Ativan, especially without medication coverage. Patient and daughter worried about ineffectiveness of medications, side effects, and cost without insurance. Declines sleep medicine referral due to cost. In the meantime will continue 2 mg Ativan every night to prevent withdrawal and side effects. Patient and daughter understand risk. Will revisit next visit. Patient's daughter believes she will have medical coverage starting in February.  Orders:    LORazepam (ATIVAN) 2 mg tablet; Take 1 tablet (2 mg total) by mouth daily at bedtime

## 2025-01-06 NOTE — ASSESSMENT & PLAN NOTE
Lab Results   Component Value Date    CHOLESTEROL 175 07/21/2023     Lab Results   Component Value Date    HDL 79 07/21/2023     Lab Results   Component Value Date    TRIG 79 07/21/2023     Lab Results   Component Value Date    NONHDLC 96 07/21/2023      Lab Results   Component Value Date    LDLCALC 80 07/21/2023      ASCVD risk 20.2%. Continue rosuvastatin 5 mg daily. Low fat diet. Will recheck lipid panel.  Orders:    Lipid panel; Future

## 2025-01-06 NOTE — ASSESSMENT & PLAN NOTE
BP Readings from Last 3 Encounters:   01/03/25 134/82   10/22/24 163/96   04/23/24 121/77      Reviewed log that was brought from home. BP primarily 110s-130/70s. Bring BP cuff to next visit. Continue to monitor daily at home. Continue HCTZ 25 mg once daily and amlodipine 5 mg once daily.  Will check renal function and electrolytes. Limit sodium and salt intake. Avoid alcohol and caffeine.   Orders:    CBC and differential; Future    TSH, 3rd generation with Free T4 reflex; Future

## 2025-01-16 DIAGNOSIS — I10 HYPERTENSION, ESSENTIAL: ICD-10-CM

## 2025-01-16 RX ORDER — AMLODIPINE BESYLATE 5 MG/1
5 TABLET ORAL DAILY
Qty: 90 TABLET | Refills: 2 | Status: SHIPPED | OUTPATIENT
Start: 2025-01-16

## 2025-01-20 DIAGNOSIS — I10 HYPERTENSION, ESSENTIAL: ICD-10-CM

## 2025-01-21 RX ORDER — HYDROCHLOROTHIAZIDE 25 MG/1
25 TABLET ORAL DAILY
Qty: 30 TABLET | Refills: 3 | Status: SHIPPED | OUTPATIENT
Start: 2025-01-21

## 2025-01-26 DIAGNOSIS — E78.2 MIXED HYPERLIPIDEMIA: ICD-10-CM

## 2025-01-26 DIAGNOSIS — M54.50 CHRONIC BILATERAL LOW BACK PAIN WITHOUT SCIATICA: ICD-10-CM

## 2025-01-26 DIAGNOSIS — G89.29 CHRONIC BILATERAL LOW BACK PAIN WITHOUT SCIATICA: ICD-10-CM

## 2025-01-26 DIAGNOSIS — H40.10X4 OPEN-ANGLE GLAUCOMA OF BOTH EYES, INDETERMINATE STAGE, UNSPECIFIED OPEN-ANGLE GLAUCOMA TYPE: ICD-10-CM

## 2025-01-26 DIAGNOSIS — I10 HYPERTENSION, ESSENTIAL: ICD-10-CM

## 2025-01-27 RX ORDER — HYDROCHLOROTHIAZIDE 25 MG/1
25 TABLET ORAL DAILY
Qty: 90 TABLET | Refills: 3 | Status: SHIPPED | OUTPATIENT
Start: 2025-01-27

## 2025-01-27 RX ORDER — TIMOLOL MALEATE 5 MG/ML
1 SOLUTION/ DROPS OPHTHALMIC 2 TIMES DAILY
Qty: 15 ML | Refills: 5 | Status: SHIPPED | OUTPATIENT
Start: 2025-01-27

## 2025-01-27 RX ORDER — ROSUVASTATIN CALCIUM 5 MG/1
5 TABLET, COATED ORAL DAILY
Qty: 90 TABLET | Refills: 3 | Status: SHIPPED | OUTPATIENT
Start: 2025-01-27 | End: 2026-01-27

## 2025-01-27 RX ORDER — LATANOPROST 50 UG/ML
1 SOLUTION/ DROPS OPHTHALMIC
Qty: 7.5 ML | Refills: 5 | Status: SHIPPED | OUTPATIENT
Start: 2025-01-27

## 2025-02-03 ENCOUNTER — TELEPHONE (OUTPATIENT)
Dept: INTERNAL MEDICINE CLINIC | Facility: CLINIC | Age: 75
End: 2025-02-03

## 2025-02-03 NOTE — TELEPHONE ENCOUNTER
Received call from patient. Introduced self and role. Patient confirmed she paid for a 3 month supply. Patient has 90 day worth of Rosuvastatin at home. Patient aware office will reach out to physician to review if other options are available that may be cheaper.     Please review and advise.

## 2025-02-04 NOTE — TELEPHONE ENCOUNTER
She does need this type of medication (statin) as she is high risk for cardiovascular issues. Once her 90 days is almost finished, we could send a prescription for pravastatin or atorvastatin as this is likely cheaper.

## 2025-02-12 NOTE — TELEPHONE ENCOUNTER
Called and spoke to patient daughter Lori (communication consent verified) advised per note. She understood and had no further questions.

## 2025-05-02 ENCOUNTER — OFFICE VISIT (OUTPATIENT)
Dept: DENTISTRY | Facility: CLINIC | Age: 75
End: 2025-05-02

## 2025-05-02 DIAGNOSIS — Z01.20 ENCOUNTER FOR DENTAL EXAMINATION AND CLEANING WITHOUT ABNORMAL FINDINGS: Primary | ICD-10-CM

## 2025-05-02 PROCEDURE — D0220 INTRAORAL - PERIAPICAL FIRST RADIOGRAPHIC IMAGE: HCPCS

## 2025-05-02 PROCEDURE — D0140 LIMITED ORAL EVALUATION - PROBLEM FOCUSED: HCPCS

## 2025-05-02 NOTE — PROGRESS NOTES
Limited Exam, PA   Patient presents at [facility]     REV MED HX: reviewed medical history, meds and allergies in EPIC  CHIEF CONCERN:  no dental pain or concerns  ASA class:  ASA 2 - Patient with mild systemic disease with no functional limitations  PAIN SCALE:  0      Exam:    Dr. Wharton     Visual and Tactile Intraoral/Extraoral Evaluation:   Oral and Oropharyngeal cancer evaluation performed. No findings.    REFERRALS: none    FINDINGS: no decay noted on #14- pt does not report any pain or discomfort. No filling needed at this time        NEXT VISIT:    Sung KRAUSE exam, 4bws   2.  3.    Next Hygiene Visit :    6 month recare     Last Panorex: fmx: 4/12/2024

## 2025-07-09 ENCOUNTER — TELEPHONE (OUTPATIENT)
Dept: INTERNAL MEDICINE CLINIC | Facility: CLINIC | Age: 75
End: 2025-07-09

## 2025-07-10 ENCOUNTER — DOCUMENTATION (OUTPATIENT)
Dept: ADMINISTRATIVE | Facility: OTHER | Age: 75
End: 2025-07-10

## 2025-07-10 NOTE — TELEPHONE ENCOUNTER
07/10/25 7:07 AM     The office's request has been received and reviewed.    The PCP has been successfully removed with a patient attribution note.     This message will now be completed.    Thank you  Gill Castellanos       
Patient is now being seen at an [facility] internal medicine office. Please remove this office as pcp.   
Pt status post . Uncomplicated recovery. Follow u with Dr Good.